# Patient Record
Sex: FEMALE | Race: WHITE | NOT HISPANIC OR LATINO | Employment: OTHER | URBAN - METROPOLITAN AREA
[De-identification: names, ages, dates, MRNs, and addresses within clinical notes are randomized per-mention and may not be internally consistent; named-entity substitution may affect disease eponyms.]

---

## 2021-09-28 ENCOUNTER — OFFICE VISIT (OUTPATIENT)
Dept: FAMILY MEDICINE CLINIC | Facility: CLINIC | Age: 51
End: 2021-09-28

## 2021-09-28 VITALS
HEART RATE: 78 BPM | BODY MASS INDEX: 22.82 KG/M2 | DIASTOLIC BLOOD PRESSURE: 80 MMHG | TEMPERATURE: 96.8 F | WEIGHT: 124 LBS | OXYGEN SATURATION: 98 % | SYSTOLIC BLOOD PRESSURE: 120 MMHG | HEIGHT: 62 IN

## 2021-09-28 DIAGNOSIS — Z12.4 ENCOUNTER FOR SCREENING FOR CERVICAL CANCER: ICD-10-CM

## 2021-09-28 DIAGNOSIS — R23.2 HOT FLASHES: ICD-10-CM

## 2021-09-28 DIAGNOSIS — Z13.31 DEPRESSION SCREENING NEGATIVE: ICD-10-CM

## 2021-09-28 DIAGNOSIS — Z00.00 ANNUAL PHYSICAL EXAM: Primary | ICD-10-CM

## 2021-09-28 DIAGNOSIS — Z86.39 HISTORY OF IRON DEFICIENCY: ICD-10-CM

## 2021-09-28 DIAGNOSIS — Z13.220 LIPID SCREENING: ICD-10-CM

## 2021-09-28 DIAGNOSIS — Z12.11 ENCOUNTER FOR SCREENING FOR MALIGNANT NEOPLASM OF COLON: ICD-10-CM

## 2021-09-28 DIAGNOSIS — N90.89 LABIAL LESION: ICD-10-CM

## 2021-09-28 DIAGNOSIS — Z12.31 ENCOUNTER FOR SCREENING MAMMOGRAM FOR BREAST CANCER: ICD-10-CM

## 2021-09-28 DIAGNOSIS — Z86.39 HISTORY OF VITAMIN D DEFICIENCY: ICD-10-CM

## 2021-09-28 DIAGNOSIS — Z11.51 ENCOUNTER FOR SCREENING FOR HUMAN PAPILLOMAVIRUS (HPV): ICD-10-CM

## 2021-09-28 DIAGNOSIS — N92.6 IRREGULAR MENSES: ICD-10-CM

## 2021-09-28 LAB
ALBUMIN SERPL-MCNC: 4.5 G/DL (ref 3.5–5.2)
ALBUMIN/GLOB SERPL: 1.9 G/DL
ALP SERPL-CCNC: 51 U/L (ref 39–117)
ALT SERPL W P-5'-P-CCNC: 6 U/L (ref 1–33)
AMORPH URATE CRY URNS QL MICRO: ABNORMAL /HPF
ANION GAP SERPL CALCULATED.3IONS-SCNC: 6.7 MMOL/L (ref 5–15)
AST SERPL-CCNC: 10 U/L (ref 1–32)
BACTERIA UR QL AUTO: ABNORMAL /HPF
BASOPHILS # BLD AUTO: 0.08 10*3/MM3 (ref 0–0.2)
BASOPHILS NFR BLD AUTO: 1.2 % (ref 0–1.5)
BILIRUB SERPL-MCNC: 0.3 MG/DL (ref 0–1.2)
BILIRUB UR QL STRIP: NEGATIVE
BUN SERPL-MCNC: 5 MG/DL (ref 6–20)
BUN/CREAT SERPL: 7.2 (ref 7–25)
CALCIUM SPEC-SCNC: 9.2 MG/DL (ref 8.6–10.5)
CHLORIDE SERPL-SCNC: 107 MMOL/L (ref 98–107)
CHOLEST SERPL-MCNC: 145 MG/DL (ref 0–200)
CLARITY UR: ABNORMAL
CO2 SERPL-SCNC: 25.3 MMOL/L (ref 22–29)
COLOR UR: YELLOW
CREAT SERPL-MCNC: 0.69 MG/DL (ref 0.57–1)
DEPRECATED RDW RBC AUTO: 44.2 FL (ref 37–54)
EOSINOPHIL # BLD AUTO: 0.08 10*3/MM3 (ref 0–0.4)
EOSINOPHIL NFR BLD AUTO: 1.2 % (ref 0.3–6.2)
ERYTHROCYTE [DISTWIDTH] IN BLOOD BY AUTOMATED COUNT: 16.7 % (ref 12.3–15.4)
FSH SERPL-ACNC: 67.2 MIU/ML
GFR SERPL CREATININE-BSD FRML MDRD: 91 ML/MIN/1.73
GLOBULIN UR ELPH-MCNC: 2.4 GM/DL
GLUCOSE SERPL-MCNC: 100 MG/DL (ref 65–99)
GLUCOSE UR STRIP-MCNC: NEGATIVE MG/DL
HCT VFR BLD AUTO: 34.7 % (ref 34–46.6)
HDLC SERPL-MCNC: 48 MG/DL (ref 40–60)
HGB BLD-MCNC: 9.8 G/DL (ref 12–15.9)
HGB UR QL STRIP.AUTO: NEGATIVE
HYALINE CASTS UR QL AUTO: ABNORMAL /LPF
IMM GRANULOCYTES # BLD AUTO: 0.01 10*3/MM3 (ref 0–0.05)
IMM GRANULOCYTES NFR BLD AUTO: 0.1 % (ref 0–0.5)
KETONES UR QL STRIP: ABNORMAL
LDLC SERPL CALC-MCNC: 83 MG/DL (ref 0–100)
LDLC/HDLC SERPL: 1.73 {RATIO}
LEUKOCYTE ESTERASE UR QL STRIP.AUTO: ABNORMAL
LH SERPL-ACNC: 58.7 MIU/ML
LYMPHOCYTES # BLD AUTO: 1.58 10*3/MM3 (ref 0.7–3.1)
LYMPHOCYTES NFR BLD AUTO: 23.3 % (ref 19.6–45.3)
MCH RBC QN AUTO: 21.2 PG (ref 26.6–33)
MCHC RBC AUTO-ENTMCNC: 28.2 G/DL (ref 31.5–35.7)
MCV RBC AUTO: 75.1 FL (ref 79–97)
MONOCYTES # BLD AUTO: 0.34 10*3/MM3 (ref 0.1–0.9)
MONOCYTES NFR BLD AUTO: 5 % (ref 5–12)
NEUTROPHILS NFR BLD AUTO: 4.68 10*3/MM3 (ref 1.7–7)
NEUTROPHILS NFR BLD AUTO: 69.2 % (ref 42.7–76)
NITRITE UR QL STRIP: NEGATIVE
NRBC BLD AUTO-RTO: 0 /100 WBC (ref 0–0.2)
PH UR STRIP.AUTO: 5.5 [PH] (ref 5–8)
PLATELET # BLD AUTO: 383 10*3/MM3 (ref 140–450)
PMV BLD AUTO: 11.4 FL (ref 6–12)
POTASSIUM SERPL-SCNC: 5.3 MMOL/L (ref 3.5–5.2)
PROGEST SERPL-MCNC: 0.13 NG/ML
PROT SERPL-MCNC: 6.9 G/DL (ref 6–8.5)
PROT UR QL STRIP: NEGATIVE
RBC # BLD AUTO: 4.62 10*6/MM3 (ref 3.77–5.28)
RBC # UR: ABNORMAL /HPF
REF LAB TEST METHOD: ABNORMAL
SODIUM SERPL-SCNC: 139 MMOL/L (ref 136–145)
SP GR UR STRIP: 1.02 (ref 1–1.03)
SQUAMOUS #/AREA URNS HPF: ABNORMAL /HPF
T4 FREE SERPL-MCNC: 0.9 NG/DL (ref 0.93–1.7)
TRIGL SERPL-MCNC: 69 MG/DL (ref 0–150)
TSH SERPL DL<=0.05 MIU/L-ACNC: 7.03 UIU/ML (ref 0.27–4.2)
UROBILINOGEN UR QL STRIP: ABNORMAL
VLDLC SERPL-MCNC: 14 MG/DL (ref 5–40)
WBC # BLD AUTO: 6.77 10*3/MM3 (ref 3.4–10.8)
WBC UR QL AUTO: ABNORMAL /HPF

## 2021-09-28 PROCEDURE — 87086 URINE CULTURE/COLONY COUNT: CPT | Performed by: NURSE PRACTITIONER

## 2021-09-28 PROCEDURE — 84443 ASSAY THYROID STIM HORMONE: CPT | Performed by: NURSE PRACTITIONER

## 2021-09-28 PROCEDURE — 83001 ASSAY OF GONADOTROPIN (FSH): CPT | Performed by: NURSE PRACTITIONER

## 2021-09-28 PROCEDURE — 3008F BODY MASS INDEX DOCD: CPT | Performed by: NURSE PRACTITIONER

## 2021-09-28 PROCEDURE — 81001 URINALYSIS AUTO W/SCOPE: CPT | Performed by: NURSE PRACTITIONER

## 2021-09-28 PROCEDURE — G0148 SCR C/V CYTO, AUTOSYS, RESCR: HCPCS | Performed by: NURSE PRACTITIONER

## 2021-09-28 PROCEDURE — 2014F MENTAL STATUS ASSESS: CPT | Performed by: NURSE PRACTITIONER

## 2021-09-28 PROCEDURE — 84144 ASSAY OF PROGESTERONE: CPT | Performed by: NURSE PRACTITIONER

## 2021-09-28 PROCEDURE — 87624 HPV HI-RISK TYP POOLED RSLT: CPT | Performed by: NURSE PRACTITIONER

## 2021-09-28 PROCEDURE — 84439 ASSAY OF FREE THYROXINE: CPT | Performed by: NURSE PRACTITIONER

## 2021-09-28 PROCEDURE — 99386 PREV VISIT NEW AGE 40-64: CPT | Performed by: NURSE PRACTITIONER

## 2021-09-28 PROCEDURE — G0123 SCREEN CERV/VAG THIN LAYER: HCPCS | Performed by: NURSE PRACTITIONER

## 2021-09-28 PROCEDURE — 80053 COMPREHEN METABOLIC PANEL: CPT | Performed by: NURSE PRACTITIONER

## 2021-09-28 PROCEDURE — 83002 ASSAY OF GONADOTROPIN (LH): CPT | Performed by: NURSE PRACTITIONER

## 2021-09-28 PROCEDURE — 85025 COMPLETE CBC W/AUTO DIFF WBC: CPT | Performed by: NURSE PRACTITIONER

## 2021-09-28 PROCEDURE — 80061 LIPID PANEL: CPT | Performed by: NURSE PRACTITIONER

## 2021-09-28 PROCEDURE — 82672 ASSAY OF ESTROGEN: CPT | Performed by: NURSE PRACTITIONER

## 2021-09-28 NOTE — PROGRESS NOTES
Venipuncture Blood Specimen Collection  Venipuncture performed in left arm  by Cammie Barone with good hemostasis. Patient tolerated the procedure well without complications.   09/28/21   Cammie Barone

## 2021-09-28 NOTE — PROGRESS NOTES
Chief Complaint  Annual Exam    Subjective            Biju Harrington presents to [facility]  History of Present Illness     Establish care - annual physical exam. Her last exam was in 2018.     She states at the time of her last physical her iron and thyroid were low - they did give her medication and she maybe took it for a month, but never refilled it or followed up after that.     Her last pap smear was a long time ago. She has not had a hysterectomy. The first day of her LMP was 9/15/2021. Her periods are irregular and have been irregular since last year. She can skip two months, then be on a period for 2 weeks. She does not have a GYN. She used to live in [place]. She has had two pregnancies with two living children, both vaginal deliveries.     Her last mammogram was done at age 42. Her first was at age 37 and her last at age 42. No history of breast cancer in the family, but mom has fibrocalcific disease. She denies any breast complaints. She does perform monthly self-exams.     She has never had a colonoscopy - she does have family history of CRC in paternal grandfather. She denies any constipation, diarrhea, or rectal bleeding. No nausea, vomiting.     PHQ-2 Total Score: 1 - she denies any concerns for depression. She was for a while but that was due to separation from her  in 2018. They are still  but not . She sees things moving in that direction. No HI/SI. She does have trouble sleeping. She states sleeping is horrible - she has night sweats, hot flashes, etc. She states that she believes that she is going through menopause.     History reviewed. No pertinent past medical history.    Allergies   Allergen Reactions   • Phenergan [Promethazine] GI Intolerance        Past Surgical History:   Procedure Laterality Date   • CHOLECYSTECTOMY     • OVARIAN CYST REMOVAL     • ROTATOR CUFF REPAIR Right    • TUBAL ABDOMINAL LIGATION          Social  "History     Tobacco Use   • Smoking status: Current Every Day Smoker     Years: 0.50     Start date: 1989   • Smokeless tobacco: Never Used   Substance Use Topics   • Alcohol use: Not on file   • Drug use: Not on file       Family History   Problem Relation Age of Onset   • Diabetes Father    • Diabetes Maternal Grandmother    • Cancer Paternal Grandfather         colon        Health Maintenance Due   Topic Date Due   • COLORECTAL CANCER SCREENING  Never done   • ANNUAL PHYSICAL  Never done   • Pneumococcal Vaccine 0-64 (1 of 2 - PPSV23) Never done   • COVID-19 Vaccine (1) Never done   • HEPATITIS C SCREENING  Never done   • PAP SMEAR  Never done        No current outpatient medications on file prior to visit.     No current facility-administered medications on file prior to visit.       Immunization History   Administered Date(s) Administered   • Tdap 07/13/2020       Review of Systems     Objective     /80   Pulse 78   Temp 96.8 °F (36 °C)   Ht 157.5 cm (62\")   Wt 56.2 kg (124 lb)   SpO2 98%   BMI 22.68 kg/m²       Physical Exam  Vitals reviewed.   Constitutional:       General: She is not in acute distress.     Appearance: Normal appearance. She is well-developed and normal weight. She is not diaphoretic.   HENT:      Head: Normocephalic and atraumatic. Hair is normal.      Right Ear: Hearing, tympanic membrane, ear canal and external ear normal. No decreased hearing noted. No drainage.      Left Ear: Hearing, tympanic membrane, ear canal and external ear normal. No decreased hearing noted.      Nose: Nose normal. No nasal deformity.      Mouth/Throat:      Lips: Pink. No lesions.      Mouth: Mucous membranes are moist.      Dentition: Normal dentition.      Pharynx: Oropharynx is clear. Uvula midline.   Eyes:      General: Lids are normal.         Right eye: No discharge.         Left eye: No discharge.      Extraocular Movements: Extraocular movements intact.      Conjunctiva/sclera: Conjunctivae " normal.      Pupils: Pupils are equal, round, and reactive to light.   Neck:      Thyroid: No thyromegaly.      Vascular: No JVD.   Cardiovascular:      Rate and Rhythm: Normal rate and regular rhythm.      Pulses: Normal pulses.      Heart sounds: Normal heart sounds. No murmur heard.   No friction rub. No gallop.    Pulmonary:      Effort: Pulmonary effort is normal. No respiratory distress.      Breath sounds: Normal breath sounds. No wheezing or rales.   Chest:      Chest wall: No deformity or tenderness.      Breasts: Breasts are symmetrical.         Right: Normal. No inverted nipple, mass, nipple discharge, skin change or tenderness.         Left: Normal. No inverted nipple, mass, nipple discharge, skin change or tenderness.   Abdominal:      General: Bowel sounds are normal. There is no distension.      Palpations: Abdomen is soft. There is no mass.      Tenderness: There is no abdominal tenderness. There is no guarding or rebound.      Hernia: No hernia is present.   Genitourinary:     General: Normal vulva.      Exam position: Lithotomy position.      Filiberto stage (genital): 5.      Labia:         Right: Lesion (approx. dime-sized cauliflower-like lesion on the right labia ) present. No rash, tenderness or injury.         Left: No rash, tenderness, lesion or injury.       Urethra: No prolapse, urethral swelling or urethral lesion.      Vagina: No signs of injury. No vaginal discharge, tenderness, bleeding, lesions or prolapsed vaginal walls.      Cervix: No cervical motion tenderness, discharge, friability, lesion, erythema or cervical bleeding.      Uterus: Not deviated, not fixed, not tender and no uterine prolapse.       Adnexa:         Right: No mass or tenderness.          Left: No mass or tenderness.        Rectum: Normal.          Comments: Vaginal atrophy noted. Cervix deviated to the left.  Musculoskeletal:         General: No tenderness or deformity. Normal range of motion.      Cervical back:  Normal range of motion and neck supple.   Lymphadenopathy:      Cervical: No cervical adenopathy.      Upper Body:      Right upper body: No axillary adenopathy.      Left upper body: No axillary adenopathy.      Lower Body: No right inguinal adenopathy. No left inguinal adenopathy.   Skin:     General: Skin is warm and dry.      Findings: No erythema or rash.   Neurological:      Mental Status: She is alert and oriented to person, place, and time.      Cranial Nerves: No cranial nerve deficit.      Motor: No abnormal muscle tone.      Coordination: Coordination normal.      Gait: Gait normal.   Psychiatric:         Mood and Affect: Mood normal.         Behavior: Behavior normal.         Thought Content: Thought content normal.         Judgment: Judgment normal.         Result Review :     The following data was reviewed by: GIANA Starr on 09/28/2021:    FERRITIN (07/13/2020 09:55)  IRON PROFILE (07/13/2020 09:55)  VITAMIN D 25 HYDROXY (07/13/2020 09:55)  TSH (07/13/2020 09:55)  LIPID PANEL (07/13/2020 09:55)  COMPREHENSIVE METABOLIC PANEL (07/13/2020 09:55)  CBC AND DIFFERENTIAL (07/13/2020 09:55)  HEMOGLOBIN A1C (07/13/2020 09:55)       Assessment and Plan      Diagnoses and all orders for this visit:    1. Annual physical exam (Primary)  -     CBC Auto Differential  -     Comprehensive Metabolic Panel  -     TSH+Free T4  -     Urinalysis With Culture If Indicated - Urine, Clean Catch    2. Encounter for screening for cervical cancer   -     PAP, Liquid Based ([facility] Only)    3. Encounter for screening for human papillomavirus (HPV)  -     IgP, Aptima HPV    4. Hot flashes  -     Estrogens, Total  -     Progesterone  -     FSH & LH    5. Lipid screening  -     Lipid Panel    6. Irregular menses  -     Estrogens, Total  -     Progesterone  -     FSH & LH    7. Encounter for screening mammogram for breast cancer  -     Mammo Screening Bilateral With CAD; Future    8. Depression screening  negative    9. Labial lesion  -     Ambulatory Referral to Gynecology    10. History of iron deficiency  -     Iron Profile  -     Ferritin    11. History of vitamin D deficiency  -     Vitamin D 25 Hydroxy    12. Encounter for screening for malignant neoplasm of colon  -     Ambulatory Referral For Screening Colonoscopy            Follow Up     Return for Recheck pending outcome of labs/imaging.    Patient was given instructions and counseling regarding her condition or for health maintenance advice. Please see specific information pulled into the AVS if appropriate.

## 2021-09-30 LAB
BACTERIA SPEC AEROBE CULT: NO GROWTH
CYTOLOGIST CVX/VAG CYTO: NORMAL
CYTOLOGY CVX/VAG DOC CYTO: NORMAL
CYTOLOGY CVX/VAG DOC THIN PREP: NORMAL
DX ICD CODE: NORMAL
HIV 1 & 2 AB SER-IMP: NORMAL
HPV I/H RISK 4 DNA CVX QL PROBE+SIG AMP: NEGATIVE
OTHER STN SPEC: NORMAL
STAT OF ADQ CVX/VAG CYTO-IMP: NORMAL

## 2021-10-02 LAB — ESTROGEN SERPL-MCNC: 78 PG/ML

## 2021-10-11 ENCOUNTER — HOSPITAL ENCOUNTER (OUTPATIENT)
Dept: MAMMOGRAPHY | Facility: HOSPITAL | Age: 51
Discharge: HOME | End: 2021-10-11
Admitting: NURSE PRACTITIONER

## 2021-10-11 DIAGNOSIS — Z12.31 ENCOUNTER FOR SCREENING MAMMOGRAM FOR BREAST CANCER: ICD-10-CM

## 2021-10-11 PROCEDURE — 77067 SCR MAMMO BI INCL CAD: CPT

## 2021-10-19 ENCOUNTER — TELEPHONE (OUTPATIENT)
Dept: GASTROENTEROLOGY | Facility: CLINIC | Age: 51
End: 2021-10-19

## 2021-10-19 NOTE — TELEPHONE ENCOUNTER
Called patient back and patient reported that she was referred for a consult for a Colonoscopy screening. Patient was put on direct access schedule.

## 2021-10-29 NOTE — TELEPHONE ENCOUNTER
Patient called and left voicemail stating that she needed to reschedule her MA DA call. Patient stated that she had forgotten about it. Patient asked if she was supposed to call or if we would call her. Patient was informed that our office would call her. Patient reported that no one called her that day and after looking through her chart, we did not have a correct number for patient on file. Patient's appointment was rescheduled and patient's number in chart was corrected.

## 2021-12-13 ENCOUNTER — PREP FOR SURGERY (OUTPATIENT)
Dept: OTHER | Facility: HOSPITAL | Age: 51
End: 2021-12-13

## 2021-12-13 ENCOUNTER — CLINICAL SUPPORT (OUTPATIENT)
Dept: GASTROENTEROLOGY | Facility: CLINIC | Age: 51
End: 2021-12-13

## 2021-12-13 ENCOUNTER — TELEPHONE (OUTPATIENT)
Dept: GASTROENTEROLOGY | Facility: CLINIC | Age: 51
End: 2021-12-13

## 2021-12-13 DIAGNOSIS — Z12.11 COLON CANCER SCREENING: Primary | ICD-10-CM

## 2021-12-13 RX ORDER — SODIUM, POTASSIUM,MAG SULFATES 17.5-3.13G
2 SOLUTION, RECONSTITUTED, ORAL ORAL TAKE AS DIRECTED
Qty: 354 ML | Refills: 0 | Status: SHIPPED | OUTPATIENT
Start: 2021-12-13 | End: 2022-02-21 | Stop reason: SDUPTHER

## 2021-12-13 NOTE — PROGRESS NOTES
SPOKE WITH PT ON A DATE FOR COLONOSCOPY OF 2/25/22 . MADE SURE CHART WAS UP TO DATE. WENT OVER PREP AND MAILED OUT INSTRUCTIONS. PUT IN ORDER FOR COLON AND SENT PREP TO PHARMACY

## 2021-12-13 NOTE — TELEPHONE ENCOUNTER
Biju Harrington  REASON FOR CALL encounter for colon screening  SENT IN PREP nultyley  No past medical history on file.  Allergies   Allergen Reactions   • Phenergan [Promethazine] GI Intolerance     Past Surgical History:   Procedure Laterality Date   • CHOLECYSTECTOMY     • OVARIAN CYST REMOVAL     • ROTATOR CUFF REPAIR Right    • TUBAL ABDOMINAL LIGATION       Social History     Socioeconomic History   • Marital status:    Tobacco Use   • Smoking status: Current Every Day Smoker     Years: 0.50     Start date: 1989   • Smokeless tobacco: Never Used     Family History   Problem Relation Age of Onset   • Diabetes Father    • Diabetes Maternal Grandmother    • Cancer Paternal Grandfather         colon     No current outpatient medications on file.

## 2021-12-28 ENCOUNTER — TELEMEDICINE (OUTPATIENT)
Dept: FAMILY MEDICINE CLINIC | Facility: CLINIC | Age: 51
End: 2021-12-28

## 2021-12-28 DIAGNOSIS — N95.1 PERIMENOPAUSAL VASOMOTOR SYMPTOMS: ICD-10-CM

## 2021-12-28 DIAGNOSIS — R45.86 MOOD CHANGES: ICD-10-CM

## 2021-12-28 DIAGNOSIS — R45.4 IRRITABILITY: Primary | ICD-10-CM

## 2021-12-28 PROCEDURE — 99214 OFFICE O/P EST MOD 30 MIN: CPT | Performed by: NURSE PRACTITIONER

## 2021-12-28 RX ORDER — VENLAFAXINE HYDROCHLORIDE 37.5 MG/1
37.5 CAPSULE, EXTENDED RELEASE ORAL DAILY
Qty: 30 CAPSULE | Refills: 0 | Status: SHIPPED | OUTPATIENT
Start: 2021-12-28 | End: 2022-01-27 | Stop reason: SDUPTHER

## 2021-12-28 NOTE — PROGRESS NOTES
Mode of Visit: Video via Haiku   Location of patient: home  You have chosen to receive care through a telehealth visit.  The patient has signed the video visit consent form.  The visit included audio and video interaction. No technical issues occurred during this visit.     Chief Complaint  Depression (restart zoloft )     Aury Harrington presents to [facility]  History of Present Illness     She wants to restart Zoloft. She has been on Zoloft in the past, but it was over a year ago, and she started taking it after  from her . Now, she and her  are back together. They are having some issues, but she feels like it is more her than him. She wants to try and make it work.     She has not had a period since September of this year. She thinks she is starting to go thru menopause. She is reynolds, irritable, and every day she finds issues with something. It is unnecessary stuff; she needs something to take the edge off. She is having hot flashes throughout the day and at night. She wakes up soaking wet at night. She denies any HI/SI. She denies any AVH. Aside from the hot flashes at night her sleep is good. She averages 6-7 hours per night. She is not tired throughout the day.    She does feel depressed at times. She has even thought about being bipolar. She states that one minute she can be 'happy, go angelique' and then the next minute she wants to tear someone's head off. She does not have a known family history, but she has a family of 'hot heads'. She can be very short-tempered at times.      Objective   Vital Signs:   There were no vitals taken for this visit.      Physical Exam   Constitutional: She appears well-developed and well-nourished. No distress.   HENT:   Head: Normocephalic and atraumatic.   Eyes: Conjunctivae and EOM are normal. No scleral icterus.   Neck: Neck normal appearance.  Pulmonary/Chest: Effort normal.   Musculoskeletal:  Normal range of motion.   Neurological: She is alert.   Skin: No rash noted. No pallor.   Psychiatric: She has a normal mood and affect. Her affect is normal. Thought content is normal. She does not express abnormal judgement.        Result Review :   The following data was reviewed by: GIANA Starr on 12/28/2021:  CMP    CMP 9/28/21   Glucose 100 (A)   BUN 5 (A)   Creatinine 0.69   eGFR Non African Am 91   Sodium 139   Potassium 5.3 (A)   Chloride 107   Calcium 9.2   Albumin 4.50   Total Bilirubin 0.3   Alkaline Phosphatase 51   AST (SGOT) 10   ALT (SGPT) 6   (A) Abnormal value            CBC    CBC 9/28/21   WBC 6.77   RBC 4.62   Hemoglobin 9.8 (A)   Hematocrit 34.7   MCV 75.1 (A)   MCH 21.2 (A)   MCHC 28.2 (A)   RDW 16.7 (A)   Platelets 383   (A) Abnormal value            Lipid Panel    Lipid Panel 9/28/21   Total Cholesterol 145   Triglycerides 69   HDL Cholesterol 48   VLDL Cholesterol 14   LDL Cholesterol  83   LDL/HDL Ratio 1.73           TSH    TSH 9/28/21   TSH 7.030 (A)   (A) Abnormal value                    Assessment and Plan      Diagnoses and all orders for this visit:    1. Irritability (Primary)  -     venlafaxine XR (EFFEXOR-XR) 37.5 MG 24 hr capsule; Take 1 capsule by mouth Daily.  Dispense: 30 capsule; Refill: 0  -     Ambulatory Referral to Psychiatry    2. Mood changes  -     venlafaxine XR (EFFEXOR-XR) 37.5 MG 24 hr capsule; Take 1 capsule by mouth Daily.  Dispense: 30 capsule; Refill: 0  -     Ambulatory Referral to Psychiatry    3. Perimenopausal vasomotor symptoms  -     venlafaxine XR (EFFEXOR-XR) 37.5 MG 24 hr capsule; Take 1 capsule by mouth Daily.  Dispense: 30 capsule; Refill: 0        Follow Up     Return in about 1 month (around 1/28/2022) for Recheck.     Trial of Effexor to see if this will help with mood and probable perimenopausal symptoms. Follow up with me in 3-4 weeks, sooner if needed. If she decides to stay with psychiatry, then she will notify the office.      Patient was given instructions and counseling regarding her condition or for health maintenance advice. Please see specific information pulled into the AVS if appropriate.

## 2022-01-24 DIAGNOSIS — N95.1 PERIMENOPAUSAL VASOMOTOR SYMPTOMS: ICD-10-CM

## 2022-01-24 DIAGNOSIS — R45.86 MOOD CHANGES: ICD-10-CM

## 2022-01-24 DIAGNOSIS — R45.4 IRRITABILITY: ICD-10-CM

## 2022-01-24 RX ORDER — VENLAFAXINE HYDROCHLORIDE 37.5 MG/1
37.5 CAPSULE, EXTENDED RELEASE ORAL DAILY
Qty: 30 CAPSULE | Refills: 0 | OUTPATIENT
Start: 2022-01-24

## 2022-01-25 DIAGNOSIS — N95.1 PERIMENOPAUSAL VASOMOTOR SYMPTOMS: ICD-10-CM

## 2022-01-25 DIAGNOSIS — R45.4 IRRITABILITY: ICD-10-CM

## 2022-01-25 DIAGNOSIS — R45.86 MOOD CHANGES: ICD-10-CM

## 2022-01-25 RX ORDER — VENLAFAXINE HYDROCHLORIDE 37.5 MG/1
37.5 CAPSULE, EXTENDED RELEASE ORAL DAILY
Qty: 30 CAPSULE | Refills: 0 | OUTPATIENT
Start: 2022-01-25

## 2022-01-27 ENCOUNTER — TELEMEDICINE (OUTPATIENT)
Dept: FAMILY MEDICINE CLINIC | Facility: CLINIC | Age: 52
End: 2022-01-27

## 2022-01-27 DIAGNOSIS — R45.86 MOOD CHANGES: ICD-10-CM

## 2022-01-27 DIAGNOSIS — N93.9 ABNORMAL VAGINAL BLEEDING: ICD-10-CM

## 2022-01-27 DIAGNOSIS — N95.1 PERIMENOPAUSAL VASOMOTOR SYMPTOMS: Primary | ICD-10-CM

## 2022-01-27 DIAGNOSIS — R45.4 IRRITABILITY: ICD-10-CM

## 2022-01-27 PROCEDURE — 99213 OFFICE O/P EST LOW 20 MIN: CPT | Performed by: NURSE PRACTITIONER

## 2022-01-27 RX ORDER — VENLAFAXINE HYDROCHLORIDE 37.5 MG/1
37.5 CAPSULE, EXTENDED RELEASE ORAL DAILY
Qty: 90 CAPSULE | Refills: 0 | Status: SHIPPED | OUTPATIENT
Start: 2022-01-27 | End: 2022-04-25

## 2022-01-27 NOTE — PROGRESS NOTES
Mode of Visit: Video  Location of patient: home   Location of provider: office  You have chosen to receive care through a telehealth visit.  The patient has signed the video visit consent form.  The visit included audio and video interaction. No technical issues occurred during this visit.     Chief Complaint  Depression (refills )    Subjective            Biju Harrington presents to [facility]     History of Present Illness     She is here today for re-evaluation of Effexor - it was initiated in place of sertraline - she feels more at ease, and not like she wants to rip anyone's head off. Her  does notice a difference as well. She does feel kind of drowsy after taking it, so she switched it from AM dosing to PM dosing, and that has helped.  From 0 to 100%, she feels 100% better.  She would like to remain at her current dose.  She denies any homicidal ideations or suicidal ideations.  She denies any AVH.    She reports having amenorrhea from about October through December.  She states 3 days after initiation of Effexor she did have recurrence of menses.  She has been having menses now for the entire month of January.  She has not establish care with gynecology in [place].  She does not want a referral today.  She states that she will monitor her bleeding, and if it is persistent, then she will seek care with gynecology.    Objective   Vital Signs:   There were no vitals taken for this visit.      Physical Exam   Constitutional: She appears well-developed and well-nourished. No distress.   HENT:   Head: Normocephalic and atraumatic.   Eyes: Conjunctivae and EOM are normal. No scleral icterus.   Pulmonary/Chest: Effort normal.   Musculoskeletal: Normal range of motion.   Neurological: She is alert.   Skin: No erythema. No pallor.   Psychiatric: She has a normal mood and affect. She mood appears normal. Her affect is normal. Her behavior is normal. Thought content is normal.  She does not express abnormal judgement.        Result Review :     The following data was reviewed by: GIANA Starr on 01/27/2022:    CMP    CMP 9/28/21   Glucose 100 (A)   BUN 5 (A)   Creatinine 0.69   eGFR Non African Am 91   Sodium 139   Potassium 5.3 (A)   Chloride 107   Calcium 9.2   Albumin 4.50   Total Bilirubin 0.3   Alkaline Phosphatase 51   AST (SGOT) 10   ALT (SGPT) 6   (A) Abnormal value            CBC    CBC 9/28/21   WBC 6.77   RBC 4.62   Hemoglobin 9.8 (A)   Hematocrit 34.7   MCV 75.1 (A)   MCH 21.2 (A)   MCHC 28.2 (A)   RDW 16.7 (A)   Platelets 383   (A) Abnormal value            Lipid Panel    Lipid Panel 9/28/21   Total Cholesterol 145   Triglycerides 69   HDL Cholesterol 48   VLDL Cholesterol 14   LDL Cholesterol  83   LDL/HDL Ratio 1.73           TSH    TSH 9/28/21   TSH 7.030 (A)   (A) Abnormal value                    Assessment and Plan      Diagnoses and all orders for this visit:    1. Perimenopausal vasomotor symptoms (Primary)  -     venlafaxine XR (EFFEXOR-XR) 37.5 MG 24 hr capsule; Take 1 capsule by mouth Daily.  Dispense: 90 capsule; Refill: 0    2. Irritability  -     venlafaxine XR (EFFEXOR-XR) 37.5 MG 24 hr capsule; Take 1 capsule by mouth Daily.  Dispense: 90 capsule; Refill: 0    3. Mood changes  -     venlafaxine XR (EFFEXOR-XR) 37.5 MG 24 hr capsule; Take 1 capsule by mouth Daily.  Dispense: 90 capsule; Refill: 0    4. Abnormal vaginal bleeding  Comments:  recommended follow up with GYN or in our office for further evaluation; declined referral at appointment today              Follow Up     Return in about 3 months (around 4/27/2022) for Recheck.     Advised patient that if bleeding does not stop in the next week, then she needs to be evaluated as soon as possible. We may need to stop Effexor and try alternative to see if this is contributing.     Patient was given instructions and counseling regarding her condition or for health maintenance advice. Please see  specific information pulled into the AVS if appropriate.

## 2022-02-18 ENCOUNTER — TELEPHONE (OUTPATIENT)
Dept: GASTROENTEROLOGY | Facility: CLINIC | Age: 52
End: 2022-02-18

## 2022-02-18 NOTE — TELEPHONE ENCOUNTER
Patient called and left a voicemail. Called patient back and patient just wanted to confirm that we had the correct address for her pharmacy as there are many [facility] in [place]. Confirmed to patient that we did have the correct information. Patient confirmed that the prep had already been sent. Patient will pick it up today.

## 2022-02-21 RX ORDER — SODIUM, POTASSIUM,MAG SULFATES 17.5-3.13G
2 SOLUTION, RECONSTITUTED, ORAL ORAL TAKE AS DIRECTED
Qty: 354 ML | Refills: 0 | Status: SHIPPED | OUTPATIENT
Start: 2022-02-21 | End: 2022-02-22 | Stop reason: CLARIF

## 2022-02-23 NOTE — TELEPHONE ENCOUNTER
"Patient has called in regards to prep. She states pharmacy told her \"one was not covered\". Patient told that monae was sent and if she had any more trouble to call office.   "

## 2022-02-25 ENCOUNTER — ANESTHESIA (OUTPATIENT)
Dept: GASTROENTEROLOGY | Facility: HOSPITAL | Age: 52
End: 2022-02-25

## 2022-02-25 ENCOUNTER — HOSPITAL ENCOUNTER (OUTPATIENT)
Facility: HOSPITAL | Age: 52
Setting detail: HOSPITAL OUTPATIENT SURGERY
Discharge: HOME | End: 2022-02-25
Attending: INTERNAL MEDICINE | Admitting: INTERNAL MEDICINE

## 2022-02-25 ENCOUNTER — ANESTHESIA EVENT (OUTPATIENT)
Dept: GASTROENTEROLOGY | Facility: HOSPITAL | Age: 52
End: 2022-02-25

## 2022-02-25 VITALS
TEMPERATURE: 96.4 F | HEIGHT: 62 IN | SYSTOLIC BLOOD PRESSURE: 122 MMHG | RESPIRATION RATE: 21 BRPM | OXYGEN SATURATION: 97 % | WEIGHT: 136.24 LBS | HEART RATE: 73 BPM | DIASTOLIC BLOOD PRESSURE: 83 MMHG | BODY MASS INDEX: 25.07 KG/M2

## 2022-02-25 DIAGNOSIS — Z12.11 COLON CANCER SCREENING: ICD-10-CM

## 2022-02-25 PROCEDURE — 45380 COLONOSCOPY AND BIOPSY: CPT | Performed by: INTERNAL MEDICINE

## 2022-02-25 PROCEDURE — 88305 TISSUE EXAM BY PATHOLOGIST: CPT | Performed by: INTERNAL MEDICINE

## 2022-02-25 PROCEDURE — 25010000002 PROPOFOL 10 MG/ML EMULSION: Performed by: NURSE ANESTHETIST, CERTIFIED REGISTERED

## 2022-02-25 RX ORDER — PROPOFOL 10 MG/ML
VIAL (ML) INTRAVENOUS AS NEEDED
Status: DISCONTINUED | OUTPATIENT
Start: 2022-02-25 | End: 2022-02-25 | Stop reason: SURG

## 2022-02-25 RX ORDER — PHENYLEPHRINE HCL IN 0.9% NACL 1 MG/10 ML
SYRINGE (ML) INTRAVENOUS AS NEEDED
Status: DISCONTINUED | OUTPATIENT
Start: 2022-02-25 | End: 2022-02-25 | Stop reason: SURG

## 2022-02-25 RX ORDER — LIDOCAINE HYDROCHLORIDE 20 MG/ML
INJECTION, SOLUTION INFILTRATION; PERINEURAL AS NEEDED
Status: DISCONTINUED | OUTPATIENT
Start: 2022-02-25 | End: 2022-02-25 | Stop reason: SURG

## 2022-02-25 RX ORDER — SODIUM CHLORIDE, SODIUM LACTATE, POTASSIUM CHLORIDE, CALCIUM CHLORIDE 600; 310; 30; 20 MG/100ML; MG/100ML; MG/100ML; MG/100ML
30 INJECTION, SOLUTION INTRAVENOUS CONTINUOUS
Status: DISCONTINUED | OUTPATIENT
Start: 2022-02-25 | End: 2022-02-25 | Stop reason: HOSPADM

## 2022-02-25 RX ORDER — GLYCOPYRROLATE 0.2 MG/ML
INJECTION INTRAMUSCULAR; INTRAVENOUS AS NEEDED
Status: DISCONTINUED | OUTPATIENT
Start: 2022-02-25 | End: 2022-02-25 | Stop reason: SURG

## 2022-02-25 RX ADMIN — LIDOCAINE HYDROCHLORIDE 100 MG: 20 INJECTION, SOLUTION INFILTRATION; PERINEURAL at 13:51

## 2022-02-25 RX ADMIN — Medication 100 MCG: at 14:17

## 2022-02-25 RX ADMIN — PROPOFOL 100 MG: 10 INJECTION, EMULSION INTRAVENOUS at 13:53

## 2022-02-25 RX ADMIN — GLYCOPYRROLATE 0.2 MG: 0.2 INJECTION INTRAMUSCULAR; INTRAVENOUS at 14:08

## 2022-02-25 RX ADMIN — PROPOFOL 200 MCG/KG/MIN: 10 INJECTION, EMULSION INTRAVENOUS at 13:52

## 2022-02-25 NOTE — ANESTHESIA POSTPROCEDURE EVALUATION
Patient: Biju Harrington    Procedure Summary     Date: 02/25/22 Room / Location: [facility] ENDOSCOPY 1 / [facility] ENDOSCOPY    Anesthesia Start: 1352 Anesthesia Stop: 1438    Procedure: COLONOSCOPY WITH BIOPSY (N/A ) Diagnosis:       Colon cancer screening      (Colon cancer screening [Z12.11])    Surgeons: Leigh Andrade MD Provider: Ezequiel Larry MD    Anesthesia Type: general ASA Status: 1          Anesthesia Type: general    Vitals  Vitals Value Taken Time   BP 87/44 02/25/22 1451   Temp 35.8 °C (96.4 °F) 02/25/22 1442   Pulse 61 02/25/22 1456   Resp 16 02/25/22 1450   SpO2 100 % 02/25/22 1456   Vitals shown include unvalidated device data.        Post Anesthesia Care and Evaluation    Patient location during evaluation: bedside  Patient participation: complete - patient participated  Level of consciousness: awake  Pain management: adequate  Airway patency: patent  Anesthetic complications: No anesthetic complications  PONV Status: none  Cardiovascular status: acceptable and stable  Respiratory status: acceptable  Hydration status: acceptable    Comments: An Anesthesiologist personally participated in the most demanding procedures (including induction and emergence if applicable) in the anesthesia plan, monitored the course of anesthesia administration at frequent intervals and remained physically present and available for immediate diagnosis and treatment of emergencies.

## 2022-02-25 NOTE — ANESTHESIA PREPROCEDURE EVALUATION
Anesthesia Evaluation     Patient summary reviewed and Nursing notes reviewed   history of anesthetic complications: PONV  NPO Solid Status: > 8 hours  NPO Liquid Status: > 2 hours           Airway   Mallampati: II  TM distance: >3 FB  Neck ROM: full  No difficulty expected  Dental      Pulmonary - negative pulmonary ROS and normal exam    breath sounds clear to auscultation  Cardiovascular - negative cardio ROS and normal exam  Exercise tolerance: good (4-7 METS)    Rhythm: regular  Rate: normal        Neuro/Psych- negative ROS  GI/Hepatic/Renal/Endo - negative ROS     Musculoskeletal     Abdominal    Substance History      OB/GYN          Other                        Anesthesia Plan    ASA 1     general       Anesthetic plan, all risks, benefits, and alternatives have been provided, discussed and informed consent has been obtained with: patient.        CODE STATUS:

## 2022-03-01 LAB
CYTO UR: NORMAL
LAB AP CASE REPORT: NORMAL
LAB AP CLINICAL INFORMATION: NORMAL
PATH REPORT.FINAL DX SPEC: NORMAL
PATH REPORT.GROSS SPEC: NORMAL

## 2022-03-02 ENCOUNTER — TELEPHONE (OUTPATIENT)
Dept: GASTROENTEROLOGY | Facility: CLINIC | Age: 52
End: 2022-03-02

## 2022-03-02 NOTE — TELEPHONE ENCOUNTER
----- Message from GIANA Lew sent at 3/2/2022  2:27 PM EST -----  Please place in recall for repeat colonoscopy in 5 years.  Send letter to patient and PCP.

## 2022-04-25 DIAGNOSIS — N95.1 PERIMENOPAUSAL VASOMOTOR SYMPTOMS: ICD-10-CM

## 2022-04-25 DIAGNOSIS — R45.86 MOOD CHANGES: ICD-10-CM

## 2022-04-25 DIAGNOSIS — R45.4 IRRITABILITY: ICD-10-CM

## 2022-04-25 RX ORDER — VENLAFAXINE HYDROCHLORIDE 37.5 MG/1
37.5 CAPSULE, EXTENDED RELEASE ORAL DAILY
Qty: 30 CAPSULE | Refills: 0 | Status: SHIPPED | OUTPATIENT
Start: 2022-04-25 | End: 2022-05-10

## 2022-04-26 ENCOUNTER — HOSPITAL ENCOUNTER (OUTPATIENT)
Facility: HOSPITAL | Age: 52
Setting detail: OBSERVATION
Discharge: HOME | End: 2022-04-27
Attending: EMERGENCY MEDICINE | Admitting: INTERNAL MEDICINE

## 2022-04-26 ENCOUNTER — APPOINTMENT (OUTPATIENT)
Dept: ULTRASOUND IMAGING | Facility: HOSPITAL | Age: 52
End: 2022-04-26

## 2022-04-26 ENCOUNTER — APPOINTMENT (OUTPATIENT)
Dept: CT IMAGING | Facility: HOSPITAL | Age: 52
End: 2022-04-26

## 2022-04-26 DIAGNOSIS — K59.00 CONSTIPATION, UNSPECIFIED CONSTIPATION TYPE: ICD-10-CM

## 2022-04-26 DIAGNOSIS — R10.9 ACUTE ABDOMINAL PAIN: Primary | ICD-10-CM

## 2022-04-26 DIAGNOSIS — D62 ACUTE BLOOD LOSS ANEMIA: ICD-10-CM

## 2022-04-26 DIAGNOSIS — N93.8 DYSFUNCTIONAL UTERINE BLEEDING: ICD-10-CM

## 2022-04-26 PROBLEM — D50.9 MICROCYTIC ANEMIA: Status: ACTIVE | Noted: 2022-04-26

## 2022-04-26 LAB
ABO GROUP BLD: NORMAL
ALBUMIN SERPL-MCNC: 4.1 G/DL (ref 3.5–5.2)
ALBUMIN/GLOB SERPL: 1.7 G/DL
ALP SERPL-CCNC: 43 U/L (ref 39–117)
ALT SERPL W P-5'-P-CCNC: 9 U/L (ref 1–33)
ANION GAP SERPL CALCULATED.3IONS-SCNC: 9.6 MMOL/L (ref 5–15)
ANISOCYTOSIS BLD QL: ABNORMAL
AST SERPL-CCNC: 11 U/L (ref 1–32)
BASOPHILS # BLD MANUAL: 0.11 10*3/MM3 (ref 0–0.2)
BASOPHILS NFR BLD MANUAL: 2.1 % (ref 0–1.5)
BILIRUB SERPL-MCNC: 0.3 MG/DL (ref 0–1.2)
BILIRUB UR QL STRIP: NEGATIVE
BLD GP AB SCN SERPL QL: NEGATIVE
BUN SERPL-MCNC: 9 MG/DL (ref 6–20)
BUN/CREAT SERPL: 13.8 (ref 7–25)
CALCIUM SPEC-SCNC: 8 MG/DL (ref 8.6–10.5)
CHLORIDE SERPL-SCNC: 105 MMOL/L (ref 98–107)
CLARITY UR: CLEAR
CO2 SERPL-SCNC: 22.4 MMOL/L (ref 22–29)
COLOR UR: YELLOW
CREAT SERPL-MCNC: 0.65 MG/DL (ref 0.57–1)
DEPRECATED RDW RBC AUTO: 52.4 FL (ref 37–54)
EGFRCR SERPLBLD CKD-EPI 2021: 108.1 ML/MIN/1.73
EOSINOPHIL # BLD MANUAL: 0.16 10*3/MM3 (ref 0–0.4)
EOSINOPHIL NFR BLD MANUAL: 3.1 % (ref 0.3–6.2)
ERYTHROCYTE [DISTWIDTH] IN BLOOD BY AUTOMATED COUNT: 19.8 % (ref 12.3–15.4)
FERRITIN SERPL-MCNC: 5.21 NG/ML (ref 13–150)
FOLATE SERPL-MCNC: 4.75 NG/ML (ref 4.78–24.2)
GLOBULIN UR ELPH-MCNC: 2.4 GM/DL
GLUCOSE SERPL-MCNC: 100 MG/DL (ref 65–99)
GLUCOSE UR STRIP-MCNC: NEGATIVE MG/DL
HCG SERPL QL: NEGATIVE
HCT VFR BLD AUTO: 24.4 % (ref 34–46.6)
HGB BLD-MCNC: 6.9 G/DL (ref 12–15.9)
HGB UR QL STRIP.AUTO: NEGATIVE
HOLD SPECIMEN: NORMAL
HOLD SPECIMEN: NORMAL
INR PPP: 1.09 (ref 0.9–1.1)
IRON 24H UR-MRATE: 10 MCG/DL (ref 37–145)
IRON 24H UR-MRATE: 14 MCG/DL (ref 37–145)
IRON SATN MFR SERPL: 3 % (ref 20–50)
KETONES UR QL STRIP: NEGATIVE
LEUKOCYTE ESTERASE UR QL STRIP.AUTO: NEGATIVE
LIPASE SERPL-CCNC: 17 U/L (ref 13–60)
LYMPHOCYTES # BLD MANUAL: 1.29 10*3/MM3 (ref 0.7–3.1)
LYMPHOCYTES NFR BLD MANUAL: 3.1 % (ref 5–12)
MCH RBC QN AUTO: 21.2 PG (ref 26.6–33)
MCHC RBC AUTO-ENTMCNC: 28.3 G/DL (ref 31.5–35.7)
MCV RBC AUTO: 74.8 FL (ref 79–97)
MICROCYTES BLD QL: ABNORMAL
MONOCYTES # BLD: 0.16 10*3/MM3 (ref 0.1–0.9)
NEUTROPHILS # BLD AUTO: 3.5 10*3/MM3 (ref 1.7–7)
NEUTROPHILS NFR BLD MANUAL: 67 % (ref 42.7–76)
NITRITE UR QL STRIP: NEGATIVE
PH UR STRIP.AUTO: 5.5 [PH] (ref 5–8)
PLAT MORPH BLD: NORMAL
PLATELET # BLD AUTO: 336 10*3/MM3 (ref 140–450)
PMV BLD AUTO: 10.6 FL (ref 6–12)
POTASSIUM SERPL-SCNC: 3.7 MMOL/L (ref 3.5–5.2)
PROT SERPL-MCNC: 6.5 G/DL (ref 6–8.5)
PROT UR QL STRIP: NEGATIVE
PROTHROMBIN TIME: 14 SECONDS (ref 11.7–14.2)
RBC # BLD AUTO: 3.26 10*6/MM3 (ref 3.77–5.28)
RETICS # AUTO: 0.07 10*6/MM3 (ref 0.02–0.13)
RETICS/RBC NFR AUTO: 2 % (ref 0.7–1.9)
RH BLD: POSITIVE
SARS-COV-2 RNA RESP QL NAA+PROBE: NOT DETECTED
SODIUM SERPL-SCNC: 137 MMOL/L (ref 136–145)
SP GR UR STRIP: <1.005 (ref 1–1.03)
T&S EXPIRATION DATE: NORMAL
TIBC SERPL-MCNC: 495 MCG/DL (ref 298–536)
TRANSFERRIN SERPL-MCNC: 332 MG/DL (ref 200–360)
UROBILINOGEN UR QL STRIP: ABNORMAL
VARIANT LYMPHS NFR BLD MANUAL: 24.7 % (ref 19.6–45.3)
VIT B12 BLD-MCNC: 389 PG/ML (ref 211–946)
WBC MORPH BLD: NORMAL
WBC NRBC COR # BLD: 5.23 10*3/MM3 (ref 3.4–10.8)
WHOLE BLOOD HOLD SPECIMEN: NORMAL
WHOLE BLOOD HOLD SPECIMEN: NORMAL

## 2022-04-26 PROCEDURE — 25010000002 NA FERRIC GLUC CPLX PER 12.5 MG: Performed by: INTERNAL MEDICINE

## 2022-04-26 PROCEDURE — 76830 TRANSVAGINAL US NON-OB: CPT

## 2022-04-26 PROCEDURE — 82746 ASSAY OF FOLIC ACID SERUM: CPT | Performed by: INTERNAL MEDICINE

## 2022-04-26 PROCEDURE — 83540 ASSAY OF IRON: CPT | Performed by: EMERGENCY MEDICINE

## 2022-04-26 PROCEDURE — U0003 INFECTIOUS AGENT DETECTION BY NUCLEIC ACID (DNA OR RNA); SEVERE ACUTE RESPIRATORY SYNDROME CORONAVIRUS 2 (SARS-COV-2) (CORONAVIRUS DISEASE [COVID-19]), AMPLIFIED PROBE TECHNIQUE, MAKING USE OF HIGH THROUGHPUT TECHNOLOGIES AS DESCRIBED BY CMS-2020-01-R: HCPCS | Performed by: EMERGENCY MEDICINE

## 2022-04-26 PROCEDURE — P9016 RBC LEUKOCYTES REDUCED: HCPCS

## 2022-04-26 PROCEDURE — 82607 VITAMIN B-12: CPT | Performed by: INTERNAL MEDICINE

## 2022-04-26 PROCEDURE — 85610 PROTHROMBIN TIME: CPT | Performed by: OBSTETRICS & GYNECOLOGY

## 2022-04-26 PROCEDURE — 85025 COMPLETE CBC W/AUTO DIFF WBC: CPT | Performed by: EMERGENCY MEDICINE

## 2022-04-26 PROCEDURE — 81003 URINALYSIS AUTO W/O SCOPE: CPT | Performed by: EMERGENCY MEDICINE

## 2022-04-26 PROCEDURE — 74177 CT ABD & PELVIS W/CONTRAST: CPT

## 2022-04-26 PROCEDURE — G0378 HOSPITAL OBSERVATION PER HR: HCPCS

## 2022-04-26 PROCEDURE — 84703 CHORIONIC GONADOTROPIN ASSAY: CPT | Performed by: EMERGENCY MEDICINE

## 2022-04-26 PROCEDURE — 85014 HEMATOCRIT: CPT | Performed by: INTERNAL MEDICINE

## 2022-04-26 PROCEDURE — 86901 BLOOD TYPING SEROLOGIC RH(D): CPT | Performed by: EMERGENCY MEDICINE

## 2022-04-26 PROCEDURE — 85007 BL SMEAR W/DIFF WBC COUNT: CPT | Performed by: EMERGENCY MEDICINE

## 2022-04-26 PROCEDURE — 86850 RBC ANTIBODY SCREEN: CPT | Performed by: EMERGENCY MEDICINE

## 2022-04-26 PROCEDURE — 86923 COMPATIBILITY TEST ELECTRIC: CPT

## 2022-04-26 PROCEDURE — 99284 EMERGENCY DEPT VISIT MOD MDM: CPT

## 2022-04-26 PROCEDURE — 85730 THROMBOPLASTIN TIME PARTIAL: CPT | Performed by: INTERNAL MEDICINE

## 2022-04-26 PROCEDURE — 82728 ASSAY OF FERRITIN: CPT | Performed by: INTERNAL MEDICINE

## 2022-04-26 PROCEDURE — C9803 HOPD COVID-19 SPEC COLLECT: HCPCS

## 2022-04-26 PROCEDURE — 84466 ASSAY OF TRANSFERRIN: CPT | Performed by: EMERGENCY MEDICINE

## 2022-04-26 PROCEDURE — 96375 TX/PRO/DX INJ NEW DRUG ADDON: CPT

## 2022-04-26 PROCEDURE — 86900 BLOOD TYPING SEROLOGIC ABO: CPT

## 2022-04-26 PROCEDURE — 86901 BLOOD TYPING SEROLOGIC RH(D): CPT

## 2022-04-26 PROCEDURE — 25010000002 IOPAMIDOL 61 % SOLUTION: Performed by: EMERGENCY MEDICINE

## 2022-04-26 PROCEDURE — 86900 BLOOD TYPING SEROLOGIC ABO: CPT | Performed by: EMERGENCY MEDICINE

## 2022-04-26 PROCEDURE — 83690 ASSAY OF LIPASE: CPT | Performed by: EMERGENCY MEDICINE

## 2022-04-26 PROCEDURE — 80053 COMPREHEN METABOLIC PANEL: CPT | Performed by: EMERGENCY MEDICINE

## 2022-04-26 PROCEDURE — 36430 TRANSFUSION BLD/BLD COMPNT: CPT

## 2022-04-26 PROCEDURE — 63710000001 DIPHENHYDRAMINE PER 50 MG: Performed by: INTERNAL MEDICINE

## 2022-04-26 PROCEDURE — 85018 HEMOGLOBIN: CPT | Performed by: INTERNAL MEDICINE

## 2022-04-26 PROCEDURE — 85045 AUTOMATED RETICULOCYTE COUNT: CPT | Performed by: INTERNAL MEDICINE

## 2022-04-26 RX ORDER — NITROGLYCERIN 0.4 MG/1
0.4 TABLET SUBLINGUAL
Status: DISCONTINUED | OUTPATIENT
Start: 2022-04-26 | End: 2022-04-27 | Stop reason: HOSPADM

## 2022-04-26 RX ORDER — ACETAMINOPHEN 160 MG/5ML
650 SOLUTION ORAL EVERY 4 HOURS PRN
Status: DISCONTINUED | OUTPATIENT
Start: 2022-04-26 | End: 2022-04-27 | Stop reason: HOSPADM

## 2022-04-26 RX ORDER — ACETAMINOPHEN 325 MG/1
650 TABLET ORAL ONCE
Status: COMPLETED | OUTPATIENT
Start: 2022-04-26 | End: 2022-04-26

## 2022-04-26 RX ORDER — NALOXONE HCL 0.4 MG/ML
0.4 VIAL (ML) INJECTION
Status: DISCONTINUED | OUTPATIENT
Start: 2022-04-26 | End: 2022-04-27 | Stop reason: HOSPADM

## 2022-04-26 RX ORDER — ACETAMINOPHEN 325 MG/1
650 TABLET ORAL EVERY 4 HOURS PRN
Status: DISCONTINUED | OUTPATIENT
Start: 2022-04-26 | End: 2022-04-27 | Stop reason: HOSPADM

## 2022-04-26 RX ORDER — ONDANSETRON 2 MG/ML
4 INJECTION INTRAMUSCULAR; INTRAVENOUS ONCE
Status: DISCONTINUED | OUTPATIENT
Start: 2022-04-26 | End: 2022-04-27 | Stop reason: HOSPADM

## 2022-04-26 RX ORDER — DIPHENHYDRAMINE HCL 50 MG
50 CAPSULE ORAL ONCE
Status: COMPLETED | OUTPATIENT
Start: 2022-04-26 | End: 2022-04-26

## 2022-04-26 RX ORDER — VENLAFAXINE HYDROCHLORIDE 37.5 MG/1
37.5 CAPSULE, EXTENDED RELEASE ORAL DAILY
Status: DISCONTINUED | OUTPATIENT
Start: 2022-04-26 | End: 2022-04-27 | Stop reason: HOSPADM

## 2022-04-26 RX ORDER — HYDROCODONE BITARTRATE AND ACETAMINOPHEN 7.5; 325 MG/1; MG/1
2 TABLET ORAL EVERY 4 HOURS PRN
Status: DISCONTINUED | OUTPATIENT
Start: 2022-04-26 | End: 2022-04-27 | Stop reason: HOSPADM

## 2022-04-26 RX ORDER — SODIUM CHLORIDE 0.9 % (FLUSH) 0.9 %
10 SYRINGE (ML) INJECTION AS NEEDED
Status: DISCONTINUED | OUTPATIENT
Start: 2022-04-26 | End: 2022-04-27 | Stop reason: HOSPADM

## 2022-04-26 RX ORDER — FENTANYL CITRATE 50 UG/ML
50 INJECTION, SOLUTION INTRAMUSCULAR; INTRAVENOUS ONCE
Status: DISCONTINUED | OUTPATIENT
Start: 2022-04-26 | End: 2022-04-26

## 2022-04-26 RX ORDER — PANTOPRAZOLE SODIUM 40 MG/10ML
40 INJECTION, POWDER, LYOPHILIZED, FOR SOLUTION INTRAVENOUS ONCE
Status: COMPLETED | OUTPATIENT
Start: 2022-04-26 | End: 2022-04-26

## 2022-04-26 RX ORDER — MORPHINE SULFATE 2 MG/ML
1 INJECTION, SOLUTION INTRAMUSCULAR; INTRAVENOUS EVERY 4 HOURS PRN
Status: DISCONTINUED | OUTPATIENT
Start: 2022-04-26 | End: 2022-04-27 | Stop reason: HOSPADM

## 2022-04-26 RX ORDER — DIPHENHYDRAMINE HCL 25 MG
50 CAPSULE ORAL ONCE
Status: DISCONTINUED | OUTPATIENT
Start: 2022-04-26 | End: 2022-04-26

## 2022-04-26 RX ORDER — ONDANSETRON 2 MG/ML
4 INJECTION INTRAMUSCULAR; INTRAVENOUS EVERY 6 HOURS PRN
Status: DISCONTINUED | OUTPATIENT
Start: 2022-04-26 | End: 2022-04-27 | Stop reason: HOSPADM

## 2022-04-26 RX ORDER — ACETAMINOPHEN 325 MG/1
650 TABLET ORAL ONCE
Status: DISCONTINUED | OUTPATIENT
Start: 2022-04-26 | End: 2022-04-26

## 2022-04-26 RX ORDER — SODIUM CHLORIDE 0.9 % (FLUSH) 0.9 %
10 SYRINGE (ML) INJECTION EVERY 12 HOURS SCHEDULED
Status: DISCONTINUED | OUTPATIENT
Start: 2022-04-26 | End: 2022-04-27 | Stop reason: HOSPADM

## 2022-04-26 RX ORDER — ACETAMINOPHEN 650 MG/1
650 SUPPOSITORY RECTAL EVERY 4 HOURS PRN
Status: DISCONTINUED | OUTPATIENT
Start: 2022-04-26 | End: 2022-04-27 | Stop reason: HOSPADM

## 2022-04-26 RX ADMIN — Medication 10 ML: at 21:37

## 2022-04-26 RX ADMIN — DIPHENHYDRAMINE HCL 50 MG: 50 CAPSULE ORAL at 21:32

## 2022-04-26 RX ADMIN — ACETAMINOPHEN 650 MG: 325 TABLET ORAL at 21:33

## 2022-04-26 RX ADMIN — PANTOPRAZOLE SODIUM 40 MG: 40 INJECTION, POWDER, FOR SOLUTION INTRAVENOUS at 16:50

## 2022-04-26 RX ADMIN — SODIUM CHLORIDE 500 ML: 9 INJECTION, SOLUTION INTRAVENOUS at 14:00

## 2022-04-26 RX ADMIN — SODIUM CHLORIDE 250 MG: 9 INJECTION, SOLUTION INTRAVENOUS at 21:36

## 2022-04-26 RX ADMIN — IOPAMIDOL 85 ML: 612 INJECTION, SOLUTION INTRAVENOUS at 14:47

## 2022-04-27 ENCOUNTER — READMISSION MANAGEMENT (OUTPATIENT)
Dept: CALL CENTER | Facility: HOSPITAL | Age: 52
End: 2022-04-27

## 2022-04-27 VITALS
HEIGHT: 62 IN | TEMPERATURE: 98.3 F | BODY MASS INDEX: 24.29 KG/M2 | HEART RATE: 64 BPM | DIASTOLIC BLOOD PRESSURE: 74 MMHG | SYSTOLIC BLOOD PRESSURE: 122 MMHG | WEIGHT: 132 LBS | OXYGEN SATURATION: 98 % | RESPIRATION RATE: 20 BRPM

## 2022-04-27 PROBLEM — D52.9 FOLIC ACID DEFICIENCY ANEMIA: Status: ACTIVE | Noted: 2022-04-27

## 2022-04-27 PROBLEM — E53.8 FOLIC ACID DEFICIENCY: Status: ACTIVE | Noted: 2022-04-27

## 2022-04-27 PROBLEM — D50.0 IRON DEFICIENCY ANEMIA DUE TO CHRONIC BLOOD LOSS: Status: ACTIVE | Noted: 2022-04-27

## 2022-04-27 PROBLEM — D25.9 FIBROID, UTERINE: Status: ACTIVE | Noted: 2022-04-27

## 2022-04-27 LAB
APTT PPP: 45 SECONDS (ref 22.7–35.4)
BH BB BLOOD EXPIRATION DATE: NORMAL
BH BB BLOOD TYPE BARCODE: 6200
BH BB DISPENSE STATUS: NORMAL
BH BB PRODUCT CODE: NORMAL
BH BB UNIT NUMBER: NORMAL
CROSSMATCH INTERPRETATION: NORMAL
HCT VFR BLD AUTO: 26.1 % (ref 34–46.6)
HCT VFR BLD AUTO: 27.8 % (ref 34–46.6)
HGB BLD-MCNC: 7.8 G/DL (ref 12–15.9)
HGB BLD-MCNC: 7.9 G/DL (ref 12–15.9)
UNIT  ABO: NORMAL
UNIT  RH: NORMAL

## 2022-04-27 PROCEDURE — 96365 THER/PROPH/DIAG IV INF INIT: CPT

## 2022-04-27 PROCEDURE — 85014 HEMATOCRIT: CPT | Performed by: INTERNAL MEDICINE

## 2022-04-27 PROCEDURE — 25010000002 CYANOCOBALAMIN PER 1000 MCG: Performed by: INTERNAL MEDICINE

## 2022-04-27 PROCEDURE — 63710000001 DIPHENHYDRAMINE PER 50 MG: Performed by: INTERNAL MEDICINE

## 2022-04-27 PROCEDURE — 25010000002 NA FERRIC GLUC CPLX PER 12.5 MG: Performed by: INTERNAL MEDICINE

## 2022-04-27 PROCEDURE — 85018 HEMOGLOBIN: CPT | Performed by: INTERNAL MEDICINE

## 2022-04-27 PROCEDURE — G0378 HOSPITAL OBSERVATION PER HR: HCPCS

## 2022-04-27 PROCEDURE — 96367 TX/PROPH/DG ADDL SEQ IV INF: CPT

## 2022-04-27 PROCEDURE — 96372 THER/PROPH/DIAG INJ SC/IM: CPT

## 2022-04-27 PROCEDURE — 96366 THER/PROPH/DIAG IV INF ADDON: CPT

## 2022-04-27 RX ORDER — MULTIPLE VITAMINS W/ MINERALS TAB 9MG-400MCG
1 TAB ORAL DAILY
Start: 2022-04-28

## 2022-04-27 RX ORDER — FAMOTIDINE 20 MG/1
20 TABLET, FILM COATED ORAL ONCE
Status: COMPLETED | OUTPATIENT
Start: 2022-04-27 | End: 2022-04-27

## 2022-04-27 RX ORDER — FOLIC ACID 1 MG/1
1 TABLET ORAL DAILY
Qty: 30 TABLET | Refills: 0 | Status: SHIPPED | OUTPATIENT
Start: 2022-04-27

## 2022-04-27 RX ORDER — ACETAMINOPHEN 325 MG/1
650 TABLET ORAL EVERY 6 HOURS PRN
Start: 2022-04-27

## 2022-04-27 RX ORDER — DIPHENHYDRAMINE HCL 25 MG
25 CAPSULE ORAL ONCE
Status: COMPLETED | OUTPATIENT
Start: 2022-04-27 | End: 2022-04-27

## 2022-04-27 RX ORDER — CYANOCOBALAMIN 1000 UG/ML
1000 INJECTION, SOLUTION INTRAMUSCULAR; SUBCUTANEOUS
Status: DISCONTINUED | OUTPATIENT
Start: 2022-04-27 | End: 2022-04-27 | Stop reason: HOSPADM

## 2022-04-27 RX ORDER — FERROUS SULFATE 325(65) MG
325 TABLET ORAL
Qty: 30 TABLET | Refills: 0 | Status: SHIPPED | OUTPATIENT
Start: 2022-04-27

## 2022-04-27 RX ORDER — MULTIPLE VITAMINS W/ MINERALS TAB 9MG-400MCG
1 TAB ORAL DAILY
Status: DISCONTINUED | OUTPATIENT
Start: 2022-04-27 | End: 2022-04-27 | Stop reason: HOSPADM

## 2022-04-27 RX ORDER — LANOLIN ALCOHOL/MO/W.PET/CERES
1000 CREAM (GRAM) TOPICAL DAILY
Qty: 30 TABLET | Refills: 0 | Status: SHIPPED | OUTPATIENT
Start: 2022-04-27

## 2022-04-27 RX ADMIN — HYDROCODONE BITARTRATE AND ACETAMINOPHEN 2 TABLET: 7.5; 325 TABLET ORAL at 08:53

## 2022-04-27 RX ADMIN — Medication 10 ML: at 08:44

## 2022-04-27 RX ADMIN — VENLAFAXINE HYDROCHLORIDE 37.5 MG: 37.5 CAPSULE, EXTENDED RELEASE ORAL at 08:44

## 2022-04-27 RX ADMIN — FAMOTIDINE 20 MG: 20 TABLET ORAL at 08:44

## 2022-04-27 RX ADMIN — DIPHENHYDRAMINE HYDROCHLORIDE 25 MG: 25 CAPSULE ORAL at 12:51

## 2022-04-27 RX ADMIN — CYANOCOBALAMIN 1000 MCG: 1000 INJECTION, SOLUTION INTRAMUSCULAR at 08:44

## 2022-04-27 RX ADMIN — SODIUM CHLORIDE 250 MG: 9 INJECTION, SOLUTION INTRAVENOUS at 08:44

## 2022-04-27 RX ADMIN — MULTIPLE VITAMINS W/ MINERALS TAB 1 TABLET: TAB at 08:44

## 2022-04-27 RX ADMIN — NORETHINDRONE 5 MG: 5 TABLET ORAL at 12:51

## 2022-04-27 RX ADMIN — FOLIC ACID 1 MG: 5 INJECTION, SOLUTION INTRAMUSCULAR; INTRAVENOUS; SUBCUTANEOUS at 11:00

## 2022-04-27 NOTE — OUTREACH NOTE
Prep Survey    Flowsheet Row Responses   [facility] patient discharged from? [place]   Is LACE score < 7 ? Yes   Emergency Room discharge w/ pulse ox? No   Eligibility [facility]   Date of Admission 04/26/22   Date of Discharge 04/27/22   Discharge Disposition Home or Self Care   Discharge diagnosis dysfunctional uterine bleeding,  anemia   Does the patient have one of the following disease processes/diagnoses(primary or secondary)? Other   Does the patient have Home health ordered? No   Is there a DME ordered? No   Prep survey completed? Yes          AURELIANO Lui Registered Nurse

## 2022-04-28 ENCOUNTER — TRANSITIONAL CARE MANAGEMENT TELEPHONE ENCOUNTER (OUTPATIENT)
Dept: CALL CENTER | Facility: HOSPITAL | Age: 52
End: 2022-04-28

## 2022-04-28 NOTE — OUTREACH NOTE
Call Center TCM Note    Flowsheet Row Responses   [facility] patient discharged from? [place]   Does the patient have one of the following disease processes/diagnoses(primary or secondary)? Other   TCM attempt successful? Yes  [verbal release for Anshu,  and mother *number not on chart]   Call start time 1454   Call end time 1502   Discharge diagnosis dysfunctional uterine bleeding,  anemia   Meds reviewed with patient/caregiver? Yes   Is the patient having any side effects they believe may be caused by any medication additions or changes? No   Does the patient have all medications ordered at discharge? Yes   Is the patient taking all medications as directed (includes completed medication regime)? Yes   Comments regarding appointments Patient will call to schedule GYN appt   Does the patient have a primary care provider?  Yes   Does the patient have an appointment with their PCP within 7 days of discharge? Yes   Comments regarding PCP Hospital PCP FOLLOW UP APPOINTMENT IS 5/4/22@145pm   Has the patient kept scheduled appointments due by today? N/A   Has home health visited the patient within 72 hours of discharge? N/A   Psychosocial issues? No   Did the patient receive a copy of their discharge instructions? Yes   Nursing interventions Reviewed instructions with patient   What is the patient's perception of their health status since discharge? Improving  [Patient reports she still feels sluggish today but reports she is not passing any more clots--bleeding she is experiencing is more like menstrual flow.  Aware of return precautions. ]   Is the patient/caregiver able to teach back signs and symptoms related to disease process for when to call PCP? Yes   Is the patient/caregiver able to teach back signs and symptoms related to disease process for when to call 911? Yes   Is the patient/caregiver able to teach back the hierarchy of who to call/visit for symptoms/problems? PCP, Specialist, Home health  nurse, Urgent Care, ED, 911 Yes   Additional teach back comments Patient has a small pinpoint knot in AC from IV site removal--no pain, no redness, no warmth or edema noted.  Encouraged to monitor for increasing s/s and notify MD durann.    TCM call completed? Yes          Debra Arndt RN    4/28/2022, 15:05 EDT

## 2022-04-29 ENCOUNTER — TELEPHONE (OUTPATIENT)
Dept: OBSTETRICS AND GYNECOLOGY | Facility: CLINIC | Age: 52
End: 2022-04-29

## 2022-05-04 ENCOUNTER — OFFICE VISIT (OUTPATIENT)
Dept: FAMILY MEDICINE CLINIC | Facility: CLINIC | Age: 52
End: 2022-05-04

## 2022-05-04 VITALS
DIASTOLIC BLOOD PRESSURE: 84 MMHG | WEIGHT: 140 LBS | OXYGEN SATURATION: 100 % | BODY MASS INDEX: 25.76 KG/M2 | SYSTOLIC BLOOD PRESSURE: 126 MMHG | HEART RATE: 70 BPM | TEMPERATURE: 97.6 F | HEIGHT: 62 IN

## 2022-05-04 DIAGNOSIS — N93.8 DYSFUNCTIONAL UTERINE BLEEDING: ICD-10-CM

## 2022-05-04 DIAGNOSIS — Z09 HOSPITAL DISCHARGE FOLLOW-UP: Primary | ICD-10-CM

## 2022-05-04 DIAGNOSIS — D50.0 IRON DEFICIENCY ANEMIA DUE TO CHRONIC BLOOD LOSS: ICD-10-CM

## 2022-05-04 DIAGNOSIS — R22.31 SKIN LUMP OF ARM, RIGHT: ICD-10-CM

## 2022-05-04 DIAGNOSIS — E53.8 B12 DEFICIENCY: ICD-10-CM

## 2022-05-04 DIAGNOSIS — E53.8 FOLATE DEFICIENCY: ICD-10-CM

## 2022-05-04 DIAGNOSIS — M79.601 PAIN IN RIGHT ARM: ICD-10-CM

## 2022-05-04 LAB
BASOPHILS # BLD AUTO: 0.1 10*3/MM3 (ref 0–0.2)
BASOPHILS NFR BLD AUTO: 1.3 % (ref 0–1.5)
DEPRECATED RDW RBC AUTO: 71.3 FL (ref 37–54)
EOSINOPHIL # BLD AUTO: 0.13 10*3/MM3 (ref 0–0.4)
EOSINOPHIL NFR BLD AUTO: 1.7 % (ref 0.3–6.2)
ERYTHROCYTE [DISTWIDTH] IN BLOOD BY AUTOMATED COUNT: 23.9 % (ref 12.3–15.4)
HCT VFR BLD AUTO: 33.1 % (ref 34–46.6)
HGB BLD-MCNC: 9.4 G/DL (ref 12–15.9)
IMM GRANULOCYTES # BLD AUTO: 0.02 10*3/MM3 (ref 0–0.05)
IMM GRANULOCYTES NFR BLD AUTO: 0.3 % (ref 0–0.5)
LYMPHOCYTES # BLD AUTO: 2.06 10*3/MM3 (ref 0.7–3.1)
LYMPHOCYTES NFR BLD AUTO: 26.7 % (ref 19.6–45.3)
MCH RBC QN AUTO: 23.7 PG (ref 26.6–33)
MCHC RBC AUTO-ENTMCNC: 28.4 G/DL (ref 31.5–35.7)
MCV RBC AUTO: 83.6 FL (ref 79–97)
MONOCYTES # BLD AUTO: 0.51 10*3/MM3 (ref 0.1–0.9)
MONOCYTES NFR BLD AUTO: 6.6 % (ref 5–12)
NEUTROPHILS NFR BLD AUTO: 4.89 10*3/MM3 (ref 1.7–7)
NEUTROPHILS NFR BLD AUTO: 63.4 % (ref 42.7–76)
NRBC BLD AUTO-RTO: 0.1 /100 WBC (ref 0–0.2)
PLATELET # BLD AUTO: 495 10*3/MM3 (ref 140–450)
PMV BLD AUTO: 11 FL (ref 6–12)
RBC # BLD AUTO: 3.96 10*6/MM3 (ref 3.77–5.28)
WBC NRBC COR # BLD: 7.71 10*3/MM3 (ref 3.4–10.8)

## 2022-05-04 PROCEDURE — 99214 OFFICE O/P EST MOD 30 MIN: CPT | Performed by: NURSE PRACTITIONER

## 2022-05-04 PROCEDURE — 85025 COMPLETE CBC W/AUTO DIFF WBC: CPT | Performed by: NURSE PRACTITIONER

## 2022-05-04 NOTE — PROGRESS NOTES
Transitional Care Follow Up Visit  Subjective     Biju Harrington is a 51 y.o. female who presents for a transitional care management visit.    Within 48 business hours after discharge our office contacted her via telephone to coordinate her care and needs.      I reviewed and discussed the details of that call along with the discharge summary, hospital problems, inpatient lab results, inpatient diagnostic studies, and consultation reports with Biju.     Current outpatient and discharge medications have been reconciled for the patient.  Reviewed by: Suzanne Kimball, GIANA      Date of TCM Phone Call 4/27/2022   [facility]   Date of Admission 4/26/2022   Date of Discharge 4/27/2022   Discharge Disposition Home or Self Care     Risk for Readmission (LACE) Score: 1 (4/27/2022  6:01 AM)      History of Present Illness   Course During Hospital Stay:      Patient presents to the office today for transitional care management following overnight admission to [facility].    She presented to the hospital on 4/26/2022 with complaints of abnormal uterine bleeding. Two weeks prior to presentation she started have a lot of vaginal bleeding, passing clots. She then developed abdominal pain causing her to double over. She was cleaning someone's house and she couldn't stay to finish, so she left, went home and got a heating pad, and tried to lay down. She states she went through 48 pads in under a week.  She was found to be anemic upon presentation with hemoglobin 6.9.  She was transfused 1 unit of PRBC and her hemoglobin improved to 7.9 at the time of discharge.    Her folate was low at 4.75.  B12 was normal, but less than 400.  Her iron was low at 10.  Iron saturation 3%.  Ferritin was low at 5.2.  She is currently prescribed ferrous sulfate 325 once daily but instructed by the hospitalist to not take for one month d/t two iron infusions while admitted. She received 1 B12 shot and then  given B12 1000 mcg daily and folic acid 1 mg daily.    She underwent imaging with transvaginal ultrasound and CT abdomen and pelvis.  CT was performed initially which showed normal appendix, fluid and/or thickening in the endometrial canal extending into the cervix and vagina, which are moderately distended.  Incidental note was made of a cervical inclusion cyst and subendometrial cystic structures suggestive of adenomyosis.  There is small volume free fluid, as well as moderate colonic stool burden, and small fat-containing umbilical hernia.  Degenerative changes of the thoracolumbar spine and pelvis were also noted.    Follow-up transvaginal ultrasound showed a thickened endometrium without a distinct focal endometrial lesion.  Heterogeneous myometrial lesion most likely represents a uterine fibroid.    She has an appointment with GYN next Thursday - she will see Dr. Russlel next week on the 12th.     She is not currently bleeding. She did spot some on Monday, but it wasn't anything really. She is taking norethindrone, but only for 10 days, to stop the bleeding.      The following portions of the patient's history were reviewed and updated as appropriate: allergies, current medications, past family history, past medical history, past social history, past surgical history and problem list.    Review of Systems    Objective   Physical Exam  Vitals reviewed.   Constitutional:       General: She is not in acute distress.     Appearance: Normal appearance. She is well-developed.   HENT:      Head: Normocephalic and atraumatic.   Eyes:      General: No scleral icterus.     Extraocular Movements: Extraocular movements intact.      Conjunctiva/sclera: Conjunctivae normal.   Neck:      Trachea: Trachea normal.   Cardiovascular:      Rate and Rhythm: Normal rate and regular rhythm.      Pulses: Normal pulses.      Heart sounds: No murmur heard.  Pulmonary:      Effort: Pulmonary effort is normal. No respiratory distress.       "Breath sounds: Normal breath sounds. No wheezing, rhonchi or rales.   Musculoskeletal:         General: Normal range of motion.      Right upper arm: Deformity (2\" nodular deformity/lump under the skin in the antecubital space) and tenderness present. No swelling, edema, lacerations or bony tenderness.      Cervical back: Normal range of motion and neck supple.      Right lower leg: No edema.      Left lower leg: No edema.   Lymphadenopathy:      Cervical: No cervical adenopathy.   Skin:     General: Skin is warm and dry.   Neurological:      Mental Status: She is alert and oriented to person, place, and time.   Psychiatric:         Mood and Affect: Mood and affect normal.         Behavior: Behavior normal.         Thought Content: Thought content normal.         Judgment: Judgment normal.       ED to Hosp-Admission (Discharged) with Eleazar Clark MD; Lonnie Kimble MD (04/26/2022)    CBC & Differential (04/26/2022 13:41)  Comprehensive Metabolic Panel (04/26/2022 13:41)  Lipase (04/26/2022 13:41)  hCG, Serum, Qualitative (04/26/2022 13:41)  Manual Differential (04/26/2022 13:41)  Iron Profile (04/26/2022 13:41)  Ferritin (04/26/2022 13:41)  Iron (04/26/2022 13:41)  Reticulocytes (04/26/2022 13:41)  Vitamin B12 (04/26/2022 13:41)  Folate (04/26/2022 13:41)  Protime-INR (04/26/2022 13:41)  Type & Screen (04/26/2022 14:03)  COVID PRE-OP / PRE-PROCEDURE SCREENING ORDER (NO ISOLATION) - Swab, Nasopharynx (04/26/2022 14:05)  Urinalysis With Microscopic If Indicated (No Culture) - Urine, Clean Catch (04/26/2022 14:31)  aPTT (04/26/2022 23:46)  Hemoglobin & Hematocrit, Blood (04/26/2022 23:47)  Hemoglobin & Hematocrit, Blood (04/27/2022 11:25)    CT Abdomen Pelvis With Contrast (04/26/2022 14:47)  US Non-ob Transvaginal (04/26/2022 16:33)    Assessment/Plan   Diagnoses and all orders for this visit:    1. Hospital discharge follow-up (Primary)    2. Dysfunctional uterine bleeding  -     CBC Auto " Differential    3. Iron deficiency anemia due to chronic blood loss  -     CBC Auto Differential    4. Folate deficiency    5. B12 deficiency    6. Pain in right arm  -     Duplex Venous Upper Extremity - Right CAR; Future    7. Skin lump of arm, right  -     Duplex Venous Upper Extremity - Right CAR; Future        Return in about 1 month (around 6/4/2022) for Next scheduled follow up.     I will call her with results of the US once received, as well as repeat CBC - suspect that the palpable deformity of the RUE is a superficial thrombosis from IV placement.

## 2022-05-04 NOTE — PROGRESS NOTES
Venipuncture Blood Specimen Collection  Venipuncture performed in left arm  by Cammie Barone with good hemostasis. Patient tolerated the procedure well without complications.   05/04/22   Cammie Barone

## 2022-05-06 ENCOUNTER — HOSPITAL ENCOUNTER (OUTPATIENT)
Dept: CARDIOLOGY | Facility: HOSPITAL | Age: 52
Discharge: HOME | End: 2022-05-06
Admitting: NURSE PRACTITIONER

## 2022-05-06 DIAGNOSIS — R22.31 SKIN LUMP OF ARM, RIGHT: ICD-10-CM

## 2022-05-06 DIAGNOSIS — M79.601 PAIN IN RIGHT ARM: ICD-10-CM

## 2022-05-06 LAB
BH CV UPPER VENOUS LEFT INTERNAL JUGULAR AUGMENT: NORMAL
BH CV UPPER VENOUS LEFT INTERNAL JUGULAR COMPRESS: NORMAL
BH CV UPPER VENOUS LEFT INTERNAL JUGULAR PHASIC: NORMAL
BH CV UPPER VENOUS LEFT INTERNAL JUGULAR SPONT: NORMAL
BH CV UPPER VENOUS LEFT SUBCLAVIAN AUGMENT: NORMAL
BH CV UPPER VENOUS LEFT SUBCLAVIAN COMPRESS: NORMAL
BH CV UPPER VENOUS LEFT SUBCLAVIAN PHASIC: NORMAL
BH CV UPPER VENOUS LEFT SUBCLAVIAN SPONT: NORMAL
BH CV UPPER VENOUS RIGHT AXILLARY AUGMENT: NORMAL
BH CV UPPER VENOUS RIGHT AXILLARY COMPRESS: NORMAL
BH CV UPPER VENOUS RIGHT AXILLARY PHASIC: NORMAL
BH CV UPPER VENOUS RIGHT AXILLARY SPONT: NORMAL
BH CV UPPER VENOUS RIGHT BASILIC FOREARM COLOR: 1
BH CV UPPER VENOUS RIGHT BASILIC FOREARM COMPRESS: NORMAL
BH CV UPPER VENOUS RIGHT BASILIC FOREARM THROMBUS: NORMAL
BH CV UPPER VENOUS RIGHT BASILIC UPPER COMPRESS: NORMAL
BH CV UPPER VENOUS RIGHT BRACHIAL COMPRESS: NORMAL
BH CV UPPER VENOUS RIGHT CEPHALIC FOREARM COMPRESS: NORMAL
BH CV UPPER VENOUS RIGHT CEPHALIC UPPER COMPRESS: NORMAL
BH CV UPPER VENOUS RIGHT INNOMINATE AUGMENT: NORMAL
BH CV UPPER VENOUS RIGHT INNOMINATE COMPRESS: NORMAL
BH CV UPPER VENOUS RIGHT INNOMINATE PHASIC: NORMAL
BH CV UPPER VENOUS RIGHT INNOMINATE SPONT: NORMAL
BH CV UPPER VENOUS RIGHT INTERNAL JUGULAR AUGMENT: NORMAL
BH CV UPPER VENOUS RIGHT INTERNAL JUGULAR COMPRESS: NORMAL
BH CV UPPER VENOUS RIGHT INTERNAL JUGULAR PHASIC: NORMAL
BH CV UPPER VENOUS RIGHT INTERNAL JUGULAR SPONT: NORMAL
BH CV UPPER VENOUS RIGHT MED CUBITAL COLOR: 1
BH CV UPPER VENOUS RIGHT MED CUBITAL COMPRESS: NORMAL
BH CV UPPER VENOUS RIGHT MED CUBITAL THROMBUS: NORMAL
BH CV UPPER VENOUS RIGHT RADIAL COMPRESS: NORMAL
BH CV UPPER VENOUS RIGHT SUBCLAVIAN AUGMENT: NORMAL
BH CV UPPER VENOUS RIGHT SUBCLAVIAN COMPRESS: NORMAL
BH CV UPPER VENOUS RIGHT SUBCLAVIAN PHASIC: NORMAL
BH CV UPPER VENOUS RIGHT SUBCLAVIAN SPONT: NORMAL
BH CV UPPER VENOUS RIGHT ULNAR COLOR: 1
BH CV UPPER VENOUS RIGHT ULNAR COMPRESS: NORMAL
BH CV UPPER VENOUS RIGHT ULNAR THROMBUS: NORMAL
MAXIMAL PREDICTED HEART RATE: 171 BPM
STRESS TARGET HR: 145 BPM

## 2022-05-06 PROCEDURE — 93971 EXTREMITY STUDY: CPT

## 2022-05-06 NOTE — PROGRESS NOTES
Today's preliminary report is positive for acute deep vein thrombosis in right arm. Acute superficial venous thrombosis in the right arm. Preliminary results were called into .

## 2022-05-10 ENCOUNTER — OFFICE VISIT (OUTPATIENT)
Dept: FAMILY MEDICINE CLINIC | Facility: CLINIC | Age: 52
End: 2022-05-10

## 2022-05-10 VITALS
TEMPERATURE: 97 F | DIASTOLIC BLOOD PRESSURE: 74 MMHG | HEART RATE: 75 BPM | WEIGHT: 139 LBS | OXYGEN SATURATION: 100 % | BODY MASS INDEX: 25.42 KG/M2 | SYSTOLIC BLOOD PRESSURE: 110 MMHG

## 2022-05-10 DIAGNOSIS — F33.2 SEVERE EPISODE OF RECURRENT MAJOR DEPRESSIVE DISORDER, WITHOUT PSYCHOTIC FEATURES: ICD-10-CM

## 2022-05-10 DIAGNOSIS — N93.8 DYSFUNCTIONAL UTERINE BLEEDING: ICD-10-CM

## 2022-05-10 DIAGNOSIS — E53.8 B12 DEFICIENCY: ICD-10-CM

## 2022-05-10 DIAGNOSIS — E53.8 FOLATE DEFICIENCY: ICD-10-CM

## 2022-05-10 DIAGNOSIS — I82.621 ACUTE DEEP VEIN THROMBOSIS (DVT) OF ULNAR VEIN OF RIGHT UPPER EXTREMITY: Primary | ICD-10-CM

## 2022-05-10 DIAGNOSIS — D50.0 IRON DEFICIENCY ANEMIA DUE TO CHRONIC BLOOD LOSS: ICD-10-CM

## 2022-05-10 PROCEDURE — 99214 OFFICE O/P EST MOD 30 MIN: CPT | Performed by: NURSE PRACTITIONER

## 2022-05-10 NOTE — PROGRESS NOTES
"Chief Complaint  Follow-up (Follow up on U/S of arm for blood clot )    Subjective            Biju Harrington presents to [facility]  History of Present Illness     Patient presents to the office today to follow-up on recent right upper extremity ultrasound showing DVT in the ulnar vein and superficial thrombophlebitis of the basilic and median cubital veins.  She was prescribed Eliquis on Friday of last week by another provider due to myself being out of the office; however, she states she went to the pharmacy to pick it up and they said that it had been \"recalled or called back\".  She has not been taking anything for anticoagulation.  She has been using a warm compress to the right upper extremity and has noted that the superficial thrombophlebitis has decreased in size somewhat.    She will see gynecology on Thursday of this week for the abnormal uterine bleeding.  She is taking norethindrone as prescribed, but has had spotting since seeing me last.  She states that she would describe it as a \"light period\".  She has not had any heavy bleeding, nor has she passed any clots.    Her PHQ-9 score is 15 on exam today.  She does not feel like the Effexor is working.  She is prescribed 37.5 mg daily for treatment of labile mood, irritability, and vasomotor symptoms.  She would like to try Zoloft.  She has taken Zoloft in the past and had good results with that.  She was on 150 mg when she stopped taking Zoloft previously.  She currently denies any homicidal ideations or suicidal ideations.  She denies any AVH.    Past Medical History:   Diagnosis Date   • Anxiety and depression    • Hot flashes    • PONV (postoperative nausea and vomiting)        Allergies   Allergen Reactions   • Phenergan [Promethazine] GI Intolerance        Past Surgical History:   Procedure Laterality Date   • CHOLECYSTECTOMY     • COLONOSCOPY N/A 2/25/2022    Procedure: COLONOSCOPY WITH BIOPSY;  Surgeon: Lupe" Leigh Zamarripa MD;  Location: [facility] ENDOSCOPY;  Service: Gastroenterology;  Laterality: N/A;  COLON POLYP, HEMORRHOIDS   • OVARIAN CYST REMOVAL     • ROTATOR CUFF REPAIR Right    • TUBAL ABDOMINAL LIGATION          Social History     Tobacco Use   • Smoking status: Former Smoker     Packs/day: 0.50     Years: 15.00     Pack years: 7.50     Start date:      Quit date: 10/2021     Years since quittin.6   • Smokeless tobacco: Never Used   Vaping Use   • Vaping Use: Never used   Substance Use Topics   • Alcohol use: Yes     Comment: occasionally   • Drug use: Never       Family History   Problem Relation Age of Onset   • Diabetes Father    • Diabetes Maternal Grandmother    • Cancer Paternal Grandfather         colon   • Malig Hyperthermia Neg Hx         Health Maintenance Due   Topic Date Due   • COVID-19 Vaccine (1) Never done   • HEPATITIS C SCREENING  Never done        Current Outpatient Medications on File Prior to Visit   Medication Sig   • acetaminophen (TYLENOL) 325 MG tablet Take 2 tablets by mouth Every 6 (Six) Hours As Needed for Mild Pain .   • docusate sodium (COLACE) 50 MG capsule Take 2 capsules by mouth 2 (Two) Times a Day As Needed for Constipation. OTC   • folic acid (FOLVITE) 1 MG tablet Take 1 tablet by mouth Daily.   • multivitamin with minerals tablet tablet Take 1 tablet by mouth Daily. OTC   • norethindrone (AYGESTIN) 5 MG tablet Take 1 tablet by mouth Daily.   • vitamin B-12 (CYANOCOBALAMIN) 1000 MCG tablet Take 1 tablet by mouth Daily.   • [DISCONTINUED] venlafaxine XR (EFFEXOR-XR) 37.5 MG 24 hr capsule TAKE 1 CAPSULE BY MOUTH DAILY (Patient taking differently: Take 37.5 mg by mouth Daily. DAILY AT DINNER)   • ferrous sulfate (FerrouSul) 325 (65 FE) MG tablet Take 1 tablet by mouth Daily With Breakfast.   • [DISCONTINUED] apixaban (ELIQUIS) 5 MG tablet tablet Take 2 tablets by mouth 2 (Two) Times a Day for 7 days.     No current facility-administered medications on file prior to visit.        Immunization History   Administered Date(s) Administered   • Tdap 07/13/2020       Review of Systems     Objective     /74   Pulse 75   Temp 97 °F (36.1 °C)   Wt 63 kg (139 lb)   SpO2 100%   BMI 25.42 kg/m²       Physical Exam  Vitals reviewed.   Constitutional:       General: She is not in acute distress.     Appearance: Normal appearance. She is well-developed.   HENT:      Head: Normocephalic and atraumatic.   Eyes:      General: No scleral icterus.     Extraocular Movements: Extraocular movements intact.      Conjunctiva/sclera: Conjunctivae normal.   Cardiovascular:      Rate and Rhythm: Normal rate and regular rhythm.      Pulses: Normal pulses.      Heart sounds: No murmur heard.  Pulmonary:      Effort: Pulmonary effort is normal. No respiratory distress.      Breath sounds: Normal breath sounds. No wheezing, rhonchi or rales.   Musculoskeletal:         General: Normal range of motion.      Right lower leg: No edema.      Left lower leg: No edema.      Comments: Right upper extremity still demonstrates approximate 2 inch nodular deformity/lump under the skin in the antecubital space, which is the suspected superficial thrombophlebitis.  Mildly tender on exam.  No swelling, edema, or redness.   Skin:     General: Skin is warm and dry.   Neurological:      Mental Status: She is alert and oriented to person, place, and time.   Psychiatric:         Mood and Affect: Mood and affect normal.         Behavior: Behavior normal.         Thought Content: Thought content normal.         Judgment: Judgment normal.         Result Review :     The following data was reviewed by: GIANA Starr on 05/10/2022:    CBC    CBC 4/26/22 4/26/22 4/27/22 5/4/22    [id number]     WBC 5.23   7.71   RBC 3.26 (A)   3.96   Hemoglobin 6.9 (A) 7.8 (A) 7.9 (A) 9.4 (A)   Hematocrit 24.4 (A) 26.1 (A) 27.8 (A) 33.1 (A)   MCV 74.8 (A)   83.6   MCH 21.2 (A)   23.7 (A)   MCHC 28.3 (A)   28.4 (A)   RDW 19.8 (A)   23.9  (A)   Platelets 336   495 (A)   (A) Abnormal value              Data reviewed: Radiologic studies :   Duplex Venous Upper Extremity - Right CAR (05/06/2022 13:45)         Assessment and Plan      Diagnoses and all orders for this visit:    1. Acute deep vein thrombosis (DVT) of ulnar vein of right upper extremity (HCC) (Primary)  -     Apixaban Starter Pack tablet therapy pack; Take two 5 mg tablets by mouth every 12 hours for 7 days. Followed by one 5 mg tablet every 12 hours. (Dispense starter pack if available)  Dispense: 74 tablet; Refill: 0  -     Ambulatory Referral to Hematology / Oncology    2. Severe episode of recurrent major depressive disorder, without psychotic features (HCC)  -     sertraline (Zoloft) 50 MG tablet; Take 1 tablet by mouth Daily.  Dispense: 30 tablet; Refill: 0    3. Dysfunctional uterine bleeding  -     Ambulatory Referral to Hematology / Oncology    4. Iron deficiency anemia due to chronic blood loss  -     Ambulatory Referral to Hematology / Oncology    5. Folate deficiency  -     Ambulatory Referral to Hematology / Oncology    6. B12 deficiency  -     Ambulatory Referral to Hematology / Oncology            Follow Up     Return in about 1 month (around 6/10/2022).     I am uncertain as to why she did not receive the Eliquis as prescribed.  Perhaps the pharmacy needed a prior authorization.  I am going to reattempt prescribing the Eliquis starter pack today.  She will call me if she is unable to pick this up from the pharmacy and we will find out if it is due to lack of coverage or need for prior authorization.    I did explain to her that Eliquis is a blood thinner, and she is currently experiencing abnormal uterine bleeding.  We will need to monitor this closely.  If she experiences any heavy bleeding and she will need to go to the emergency room.  She will see gynecology on Thursday.  I am also going to refer her to hematology/oncology to further evaluate her from a hematological  standpoint.  She is taking iron, folic acid, and B12 currently.    I will initiate sertraline 50 mg once daily for treatment of her depression.  She will stop Effexor.  Follow-up with me in 1 month to reassess.    Patient was given instructions and counseling regarding her condition or for health maintenance advice. Please see specific information pulled into the AVS if appropriate.     Biju Harrington  reports that she quit smoking about 7 months ago. She started smoking about 33 years ago. She has a 7.50 pack-year smoking history. She has never used smokeless tobacco.

## 2022-05-12 ENCOUNTER — HOSPITAL ENCOUNTER (OUTPATIENT)
Facility: HOSPITAL | Age: 52
Discharge: HOME | End: 2022-05-14
Attending: EMERGENCY MEDICINE | Admitting: STUDENT IN AN ORGANIZED HEALTH CARE EDUCATION/TRAINING PROGRAM

## 2022-05-12 ENCOUNTER — OFFICE VISIT (OUTPATIENT)
Dept: OBSTETRICS AND GYNECOLOGY | Facility: CLINIC | Age: 52
End: 2022-05-12

## 2022-05-12 VITALS
WEIGHT: 138 LBS | BODY MASS INDEX: 25.4 KG/M2 | DIASTOLIC BLOOD PRESSURE: 82 MMHG | HEIGHT: 62 IN | SYSTOLIC BLOOD PRESSURE: 126 MMHG

## 2022-05-12 DIAGNOSIS — D25.9 UTERINE LEIOMYOMA, UNSPECIFIED LOCATION: ICD-10-CM

## 2022-05-12 DIAGNOSIS — N93.8 UTERINE BLEEDING, DYSFUNCTIONAL: ICD-10-CM

## 2022-05-12 DIAGNOSIS — D25.1 INTRAMURAL LEIOMYOMA OF UTERUS: ICD-10-CM

## 2022-05-12 DIAGNOSIS — N93.8 DUB (DYSFUNCTIONAL UTERINE BLEEDING): Primary | ICD-10-CM

## 2022-05-12 DIAGNOSIS — D64.9 ANEMIA, UNSPECIFIED TYPE: ICD-10-CM

## 2022-05-12 DIAGNOSIS — I82.621 ACUTE DEEP VEIN THROMBOSIS (DVT) OF ULNAR VEIN OF RIGHT UPPER EXTREMITY: ICD-10-CM

## 2022-05-12 DIAGNOSIS — N93.9 ABNORMAL UTERINE BLEEDING (AUB): Primary | ICD-10-CM

## 2022-05-12 DIAGNOSIS — N93.9 ABNORMAL UTERINE BLEEDING: ICD-10-CM

## 2022-05-12 LAB
ABO GROUP BLD: NORMAL
ALBUMIN SERPL-MCNC: 4.2 G/DL (ref 3.5–5.2)
ALBUMIN/GLOB SERPL: 1.8 G/DL
ALP SERPL-CCNC: 34 U/L (ref 39–117)
ALT SERPL W P-5'-P-CCNC: 10 U/L (ref 1–33)
ANION GAP SERPL CALCULATED.3IONS-SCNC: 10 MMOL/L (ref 5–15)
APTT PPP: 29.8 SECONDS (ref 22.7–35.4)
AST SERPL-CCNC: 14 U/L (ref 1–32)
BASOPHILS # BLD AUTO: 0.1 10*3/MM3 (ref 0–0.2)
BASOPHILS NFR BLD AUTO: 1.7 % (ref 0–1.5)
BILIRUB SERPL-MCNC: 0.2 MG/DL (ref 0–1.2)
BLD GP AB SCN SERPL QL: NEGATIVE
BUN SERPL-MCNC: 10 MG/DL (ref 6–20)
BUN/CREAT SERPL: 14.1 (ref 7–25)
CALCIUM SPEC-SCNC: 8.8 MG/DL (ref 8.6–10.5)
CHLORIDE SERPL-SCNC: 107 MMOL/L (ref 98–107)
CO2 SERPL-SCNC: 23 MMOL/L (ref 22–29)
CREAT SERPL-MCNC: 0.71 MG/DL (ref 0.57–1)
DEPRECATED RDW RBC AUTO: 71.1 FL (ref 37–54)
EGFRCR SERPLBLD CKD-EPI 2021: 104.4 ML/MIN/1.73
EOSINOPHIL # BLD AUTO: 0.08 10*3/MM3 (ref 0–0.4)
EOSINOPHIL NFR BLD AUTO: 1.3 % (ref 0.3–6.2)
ERYTHROCYTE [DISTWIDTH] IN BLOOD BY AUTOMATED COUNT: 25.2 % (ref 12.3–15.4)
GLOBULIN UR ELPH-MCNC: 2.3 GM/DL
GLUCOSE SERPL-MCNC: 138 MG/DL (ref 65–99)
HCG SERPL QL: NEGATIVE
HCT VFR BLD AUTO: 28.1 % (ref 34–46.6)
HGB BLD-MCNC: 8.8 G/DL (ref 12–15.9)
INR PPP: 1.12 (ref 0.9–1.1)
LYMPHOCYTES # BLD AUTO: 2.04 10*3/MM3 (ref 0.7–3.1)
LYMPHOCYTES NFR BLD AUTO: 33.7 % (ref 19.6–45.3)
MCH RBC QN AUTO: 25.4 PG (ref 26.6–33)
MCHC RBC AUTO-ENTMCNC: 31.3 G/DL (ref 31.5–35.7)
MCV RBC AUTO: 81.2 FL (ref 79–97)
MONOCYTES # BLD AUTO: 0.36 10*3/MM3 (ref 0.1–0.9)
MONOCYTES NFR BLD AUTO: 5.9 % (ref 5–12)
NEUTROPHILS NFR BLD AUTO: 3.47 10*3/MM3 (ref 1.7–7)
NEUTROPHILS NFR BLD AUTO: 57.2 % (ref 42.7–76)
PLATELET # BLD AUTO: 421 10*3/MM3 (ref 140–450)
PMV BLD AUTO: 10.9 FL (ref 6–12)
POTASSIUM SERPL-SCNC: 4 MMOL/L (ref 3.5–5.2)
PROT SERPL-MCNC: 6.5 G/DL (ref 6–8.5)
PROTHROMBIN TIME: 14.4 SECONDS (ref 11.7–14.2)
RBC # BLD AUTO: 3.46 10*6/MM3 (ref 3.77–5.28)
RH BLD: POSITIVE
SARS-COV-2 RNA RESP QL NAA+PROBE: NOT DETECTED
SODIUM SERPL-SCNC: 140 MMOL/L (ref 136–145)
T&S EXPIRATION DATE: NORMAL
WBC NRBC COR # BLD: 6.06 10*3/MM3 (ref 3.4–10.8)

## 2022-05-12 PROCEDURE — 86850 RBC ANTIBODY SCREEN: CPT | Performed by: EMERGENCY MEDICINE

## 2022-05-12 PROCEDURE — 86901 BLOOD TYPING SEROLOGIC RH(D): CPT | Performed by: EMERGENCY MEDICINE

## 2022-05-12 PROCEDURE — G0378 HOSPITAL OBSERVATION PER HR: HCPCS

## 2022-05-12 PROCEDURE — 86923 COMPATIBILITY TEST ELECTRIC: CPT

## 2022-05-12 PROCEDURE — 99219 PR INITIAL OBSERVATION CARE/DAY 50 MINUTES: CPT | Performed by: OBSTETRICS & GYNECOLOGY

## 2022-05-12 PROCEDURE — 99284 EMERGENCY DEPT VISIT MOD MDM: CPT

## 2022-05-12 PROCEDURE — U0003 INFECTIOUS AGENT DETECTION BY NUCLEIC ACID (DNA OR RNA); SEVERE ACUTE RESPIRATORY SYNDROME CORONAVIRUS 2 (SARS-COV-2) (CORONAVIRUS DISEASE [COVID-19]), AMPLIFIED PROBE TECHNIQUE, MAKING USE OF HIGH THROUGHPUT TECHNOLOGIES AS DESCRIBED BY CMS-2020-01-R: HCPCS | Performed by: EMERGENCY MEDICINE

## 2022-05-12 PROCEDURE — 85730 THROMBOPLASTIN TIME PARTIAL: CPT | Performed by: EMERGENCY MEDICINE

## 2022-05-12 PROCEDURE — 99203 OFFICE O/P NEW LOW 30 MIN: CPT | Performed by: STUDENT IN AN ORGANIZED HEALTH CARE EDUCATION/TRAINING PROGRAM

## 2022-05-12 PROCEDURE — C9803 HOPD COVID-19 SPEC COLLECT: HCPCS

## 2022-05-12 PROCEDURE — 80053 COMPREHEN METABOLIC PANEL: CPT | Performed by: EMERGENCY MEDICINE

## 2022-05-12 PROCEDURE — 86900 BLOOD TYPING SEROLOGIC ABO: CPT | Performed by: EMERGENCY MEDICINE

## 2022-05-12 PROCEDURE — 84703 CHORIONIC GONADOTROPIN ASSAY: CPT | Performed by: EMERGENCY MEDICINE

## 2022-05-12 PROCEDURE — 85610 PROTHROMBIN TIME: CPT | Performed by: EMERGENCY MEDICINE

## 2022-05-12 PROCEDURE — 85025 COMPLETE CBC W/AUTO DIFF WBC: CPT | Performed by: EMERGENCY MEDICINE

## 2022-05-12 RX ORDER — CHOLECALCIFEROL (VITAMIN D3) 125 MCG
1000 CAPSULE ORAL DAILY
Status: DISCONTINUED | OUTPATIENT
Start: 2022-05-13 | End: 2022-05-14 | Stop reason: HOSPADM

## 2022-05-12 RX ORDER — ONDANSETRON 2 MG/ML
4 INJECTION INTRAMUSCULAR; INTRAVENOUS EVERY 6 HOURS PRN
Status: DISCONTINUED | OUTPATIENT
Start: 2022-05-12 | End: 2022-05-13 | Stop reason: SDUPTHER

## 2022-05-12 RX ORDER — FOLIC ACID 1 MG/1
1 TABLET ORAL DAILY
Status: DISCONTINUED | OUTPATIENT
Start: 2022-05-13 | End: 2022-05-14 | Stop reason: HOSPADM

## 2022-05-12 RX ORDER — MULTIPLE VITAMINS W/ MINERALS TAB 9MG-400MCG
1 TAB ORAL DAILY
Status: DISCONTINUED | OUTPATIENT
Start: 2022-05-13 | End: 2022-05-13

## 2022-05-12 RX ORDER — SODIUM CHLORIDE 0.9 % (FLUSH) 0.9 %
10 SYRINGE (ML) INJECTION AS NEEDED
Status: DISCONTINUED | OUTPATIENT
Start: 2022-05-12 | End: 2022-05-14 | Stop reason: HOSPADM

## 2022-05-12 RX ORDER — ACETAMINOPHEN 325 MG/1
650 TABLET ORAL EVERY 4 HOURS PRN
Status: DISCONTINUED | OUTPATIENT
Start: 2022-05-12 | End: 2022-05-13

## 2022-05-12 RX ORDER — SODIUM CHLORIDE, SODIUM LACTATE, POTASSIUM CHLORIDE, CALCIUM CHLORIDE 600; 310; 30; 20 MG/100ML; MG/100ML; MG/100ML; MG/100ML
100 INJECTION, SOLUTION INTRAVENOUS CONTINUOUS
Status: DISCONTINUED | OUTPATIENT
Start: 2022-05-13 | End: 2022-05-13

## 2022-05-12 RX ORDER — NITROGLYCERIN 0.4 MG/1
0.4 TABLET SUBLINGUAL
Status: DISCONTINUED | OUTPATIENT
Start: 2022-05-12 | End: 2022-05-13

## 2022-05-12 RX ORDER — SODIUM CHLORIDE 0.9 % (FLUSH) 0.9 %
10 SYRINGE (ML) INJECTION EVERY 12 HOURS SCHEDULED
Status: DISCONTINUED | OUTPATIENT
Start: 2022-05-12 | End: 2022-05-13

## 2022-05-12 RX ORDER — FERROUS SULFATE 325(65) MG
325 TABLET ORAL
Status: DISCONTINUED | OUTPATIENT
Start: 2022-05-13 | End: 2022-05-14 | Stop reason: HOSPADM

## 2022-05-12 RX ORDER — ONDANSETRON 4 MG/1
4 TABLET, FILM COATED ORAL EVERY 6 HOURS PRN
Status: DISCONTINUED | OUTPATIENT
Start: 2022-05-12 | End: 2022-05-13 | Stop reason: SDUPTHER

## 2022-05-12 RX ORDER — CHOLECALCIFEROL (VITAMIN D3) 125 MCG
5 CAPSULE ORAL NIGHTLY PRN
Status: DISCONTINUED | OUTPATIENT
Start: 2022-05-12 | End: 2022-05-13

## 2022-05-12 RX ORDER — SODIUM CHLORIDE 0.9 % (FLUSH) 0.9 %
10 SYRINGE (ML) INJECTION AS NEEDED
Status: DISCONTINUED | OUTPATIENT
Start: 2022-05-12 | End: 2022-05-13

## 2022-05-12 NOTE — ED TRIAGE NOTES
Pt states that she has had heavy vaginal bleeding for since Sunday. Reports that she was seen for the same issues 2 weeks ago. Bleeding stopped then but pt developed a DVT in her arm from the IV insertion. Pt was placed on a blood thinner. Pt states that she is using 3-4 pads an hour and passing large clots. Reports intermittent pains.     Patient masked at arrival and triage staff wore all appropriate PPE during entire encounter with patient.

## 2022-05-12 NOTE — PROGRESS NOTES
Chief Complaint   Patient presents with   • [facility] 2 weeks ago heavy bleeding and still bleeding heavy.On blood thinners.Had iron infusion and blood transfusion.        SUBJECTIVE:     Biju Harrington is a 51 y.o.  who presents for follow up of abnormal uterine bleeding. She reports over the last year and half, she has experienced more irregular periods that are very heavy and passing clots. She has ruined clothes that she has bleed onto and bleed down to the mattress pad on occasion. She was recently hospitalized on 22 for severe anemia secondary to heavy vaginal bleeding with Hgb 6.9 on admission that required a blood transfusion and iron transfusion. While there got ultrasound that demonstrated thickened endometrium without focal lesion and heterogeneous echogenicity in the myometrium that could represent uterine fibroid vs adenomyosis. She was evaluated by the OB/GYN Hospitalist and sent home on Aygestin 5 mg daily. Upon discharge from the hospital, the patient had stopped bleeding but started bleeding again within one week of discharge despite being on Aygestin.   She also was diagnosed with a deep vein thrombosis of the ulnar vein of the right arm and superficial thrombophlebitis of the basilic and median cubital veins on 22 following complaints of right arm pain following hospital admission. She was started on Apixaban but did not take it today because she is bleeding very heavy today.     Menarche at age 13.   Regular menses until about a year and half ago.     Contraception: Tubal ligation     Denies history of STDs.   Reports history of cervical dysplasia and thinks she has had a LEEP. Pap smear- 23- NILM, HPV negative.     OB Hx:     x 2, 23 year old and 28 year old  Largest baby weighed 8 lb 1 oz    Past Medical History:   Diagnosis Date   • Anxiety and depression    • Hot flashes    • Migraine    • PONV (postoperative nausea and vomiting)       Past Surgical  "History:   Procedure Laterality Date   • CHOLECYSTECTOMY     • COLONOSCOPY N/A 2022    Procedure: COLONOSCOPY WITH BIOPSY;  Surgeon: Leigh Andrade MD;  Location: [facility] ENDOSCOPY;  Service: Gastroenterology;  Laterality: N/A;  COLON POLYP, HEMORRHOIDS   • OVARIAN CYST REMOVAL     • ROTATOR CUFF REPAIR Right    • TUBAL ABDOMINAL LIGATION        Social History     Tobacco Use   • Smoking status: Former Smoker     Packs/day: 0.50     Years: 15.00     Pack years: 7.50     Start date:      Quit date: 10/2021     Years since quittin.6   • Smokeless tobacco: Never Used   Vaping Use   • Vaping Use: Never used   Substance Use Topics   • Alcohol use: Yes     Comment: occasionally   • Drug use: Never     OB History   No obstetric history on file.        Review of Systems   Genitourinary: Positive for menstrual problem and vaginal bleeding. Negative for vaginal discharge.       OBJECTIVE:   Vitals:    22 1040   BP: 126/82   Weight: 62.6 kg (138 lb)   Height: 157.5 cm (62.01\")        Physical Exam  Vitals reviewed.   Constitutional:       General: She is not in acute distress.  HENT:      Head: Normocephalic and atraumatic.      Right Ear: External ear normal.      Left Ear: External ear normal.   Eyes:      Extraocular Movements: Extraocular movements intact.      Pupils: Pupils are equal, round, and reactive to light.   Pulmonary:      Effort: Pulmonary effort is normal. No respiratory distress.   Genitourinary:     Comments: Deferred due to heavy vaginal bleeding  Musculoskeletal:         General: No deformity. Normal range of motion.      Cervical back: Normal range of motion and neck supple.   Skin:     General: Skin is warm and dry.   Neurological:      General: No focal deficit present.      Mental Status: She is alert and oriented to person, place, and time.   Psychiatric:         Mood and Affect: Mood normal.         Behavior: Behavior normal.         ASSESSMENT:     ICD-10-CM ICD-9-CM   1. " Abnormal uterine bleeding (AUB)  N93.9 626.9       PLAN:   - I reviewed her hospital record including ultrasound images of the pelvis and her hospital course. She is currently taking Aygestin 5 mg daily for management of her bleeding that has become heavy again today. Recommended she increase to 10 mg daily at this time until she is evaluated by Hem/Onc on Monday for her acute DVT of the right arm.   - We discussed options for treatment of AUB- fibroids and possibly adenomyosis including but not limited to Aygestin (hormonal pill- progesterone),  Lupron, Depo Provera, Mirena IUD, and surgical management which includes hysteroscopy, D&C, possible myomectomy, endometrial ablation and hysterectomy. The patient is interested in surgical management, specifically hysterectomy since she has had a tubal ligation and would like definitive management. However, she has recently had an acute DVT diagnosed and has been placed on anticoagulation (which is likely exacerbating the vaginal bleeding) and major surgery would be inadvisable until the patient is evaluated by Hem/Onc and off anticoagulation if possible. Patient indicates understanding of balancing risk of heavy bleeding and acute DVT and will await Hem/Onc recommendations.   - Recommended the patient return for endometrial biopsy to rule out endometrial hyperplasia and malignancy (which again I feel are low risk given pelvic ultrasound showing likely fibroid vs adenomyosis) but deferred today due to heavy vaginal bleeding and risk of inadequate sampling.   - Will have patient return in 2 weeks for EMB and continue on Aygestin but increase to 10 mg daily. If bleeding starts to worsen, will send Lupron injection to pharmacy for patient to bring in for administration at time of EMB so that it will put the ovaries in a quiescent state and lead to likely menopausal symptoms.   - Patient agrees with above plan of care and all questions answered.       Return in about 2 weeks  (around 5/26/2022) for EMB.    Jia Russell MD

## 2022-05-13 ENCOUNTER — ANESTHESIA (OUTPATIENT)
Dept: PERIOP | Facility: HOSPITAL | Age: 52
End: 2022-05-13

## 2022-05-13 ENCOUNTER — ANESTHESIA EVENT (OUTPATIENT)
Dept: PERIOP | Facility: HOSPITAL | Age: 52
End: 2022-05-13

## 2022-05-13 PROBLEM — I82.621: Status: ACTIVE | Noted: 2022-05-13

## 2022-05-13 PROBLEM — D64.9 ANEMIA: Status: ACTIVE | Noted: 2022-05-13

## 2022-05-13 LAB
ANION GAP SERPL CALCULATED.3IONS-SCNC: 9 MMOL/L (ref 5–15)
ANISOCYTOSIS BLD QL: NORMAL
BUN SERPL-MCNC: 10 MG/DL (ref 6–20)
BUN/CREAT SERPL: 15.2 (ref 7–25)
CALCIUM SPEC-SCNC: 7.9 MG/DL (ref 8.6–10.5)
CHLORIDE SERPL-SCNC: 109 MMOL/L (ref 98–107)
CO2 SERPL-SCNC: 21 MMOL/L (ref 22–29)
CREAT SERPL-MCNC: 0.66 MG/DL (ref 0.57–1)
DEPRECATED RDW RBC AUTO: 78.3 FL (ref 37–54)
EGFRCR SERPLBLD CKD-EPI 2021: 107.7 ML/MIN/1.73
EOSINOPHIL # BLD MANUAL: 0.2 10*3/MM3 (ref 0–0.4)
EOSINOPHIL NFR BLD MANUAL: 3 % (ref 0.3–6.2)
ERYTHROCYTE [DISTWIDTH] IN BLOOD BY AUTOMATED COUNT: 25.3 % (ref 12.3–15.4)
GLUCOSE SERPL-MCNC: 107 MG/DL (ref 65–99)
HCT VFR BLD AUTO: 24.5 % (ref 34–46.6)
HCT VFR BLD AUTO: 24.5 % (ref 34–46.6)
HCT VFR BLD AUTO: 24.7 % (ref 34–46.6)
HCT VFR BLD AUTO: 25.5 % (ref 34–46.6)
HCT VFR BLD AUTO: 26.2 % (ref 34–46.6)
HGB BLD-MCNC: 7.1 G/DL (ref 12–15.9)
HGB BLD-MCNC: 7.2 G/DL (ref 12–15.9)
HGB BLD-MCNC: 7.8 G/DL (ref 12–15.9)
HGB BLD-MCNC: 8.1 G/DL (ref 12–15.9)
HGB BLD-MCNC: 8.1 G/DL (ref 12–15.9)
HYPOCHROMIA BLD QL: NORMAL
LYMPHOCYTES # BLD MANUAL: 2.8 10*3/MM3 (ref 0.7–3.1)
LYMPHOCYTES NFR BLD MANUAL: 5.1 % (ref 5–12)
MCH RBC QN AUTO: 25.1 PG (ref 26.6–33)
MCHC RBC AUTO-ENTMCNC: 28.7 G/DL (ref 31.5–35.7)
MCV RBC AUTO: 87.3 FL (ref 79–97)
MICROCYTES BLD QL: NORMAL
MONOCYTES # BLD: 0.34 10*3/MM3 (ref 0.1–0.9)
NEUTROPHILS # BLD AUTO: 3.41 10*3/MM3 (ref 1.7–7)
NEUTROPHILS NFR BLD MANUAL: 50.5 % (ref 42.7–76)
NRBC BLD AUTO-RTO: 0 /100 WBC (ref 0–0.2)
PLAT MORPH BLD: NORMAL
PLATELET # BLD AUTO: 335 10*3/MM3 (ref 140–450)
PMV BLD AUTO: 11.2 FL (ref 6–12)
POIKILOCYTOSIS BLD QL SMEAR: NORMAL
POLYCHROMASIA BLD QL SMEAR: NORMAL
POTASSIUM SERPL-SCNC: 4.1 MMOL/L (ref 3.5–5.2)
RBC # BLD AUTO: 2.83 10*6/MM3 (ref 3.77–5.28)
SCHISTOCYTES BLD QL SMEAR: NORMAL
SODIUM SERPL-SCNC: 139 MMOL/L (ref 136–145)
VARIANT LYMPHS NFR BLD MANUAL: 41.4 % (ref 19.6–45.3)
WBC MORPH BLD: NORMAL
WBC NRBC COR # BLD: 6.76 10*3/MM3 (ref 3.4–10.8)

## 2022-05-13 PROCEDURE — 85025 COMPLETE CBC W/AUTO DIFF WBC: CPT | Performed by: OBSTETRICS & GYNECOLOGY

## 2022-05-13 PROCEDURE — P9016 RBC LEUKOCYTES REDUCED: HCPCS

## 2022-05-13 PROCEDURE — 99224 PR SBSQ OBSERVATION CARE/DAY 15 MINUTES: CPT | Performed by: STUDENT IN AN ORGANIZED HEALTH CARE EDUCATION/TRAINING PROGRAM

## 2022-05-13 PROCEDURE — 25010000002 PROPOFOL 10 MG/ML EMULSION: Performed by: NURSE ANESTHETIST, CERTIFIED REGISTERED

## 2022-05-13 PROCEDURE — 85014 HEMATOCRIT: CPT | Performed by: PHYSICIAN ASSISTANT

## 2022-05-13 PROCEDURE — C1782 MORCELLATOR: HCPCS | Performed by: STUDENT IN AN ORGANIZED HEALTH CARE EDUCATION/TRAINING PROGRAM

## 2022-05-13 PROCEDURE — 85018 HEMOGLOBIN: CPT | Performed by: PHYSICIAN ASSISTANT

## 2022-05-13 PROCEDURE — G0378 HOSPITAL OBSERVATION PER HR: HCPCS

## 2022-05-13 PROCEDURE — 25010000002 FENTANYL CITRATE (PF) 50 MCG/ML SOLUTION: Performed by: NURSE ANESTHETIST, CERTIFIED REGISTERED

## 2022-05-13 PROCEDURE — 88331 PATH CONSLTJ SURG 1 BLK 1SPC: CPT | Performed by: STUDENT IN AN ORGANIZED HEALTH CARE EDUCATION/TRAINING PROGRAM

## 2022-05-13 PROCEDURE — 85007 BL SMEAR W/DIFF WBC COUNT: CPT | Performed by: OBSTETRICS & GYNECOLOGY

## 2022-05-13 PROCEDURE — 85018 HEMOGLOBIN: CPT

## 2022-05-13 PROCEDURE — 85014 HEMATOCRIT: CPT

## 2022-05-13 PROCEDURE — 88305 TISSUE EXAM BY PATHOLOGIST: CPT | Performed by: STUDENT IN AN ORGANIZED HEALTH CARE EDUCATION/TRAINING PROGRAM

## 2022-05-13 PROCEDURE — 80048 BASIC METABOLIC PNL TOTAL CA: CPT | Performed by: PHYSICIAN ASSISTANT

## 2022-05-13 PROCEDURE — 58563 HYSTEROSCOPY ABLATION: CPT | Performed by: STUDENT IN AN ORGANIZED HEALTH CARE EDUCATION/TRAINING PROGRAM

## 2022-05-13 PROCEDURE — 36430 TRANSFUSION BLD/BLD COMPNT: CPT

## 2022-05-13 PROCEDURE — 25010000002 DEXAMETHASONE PER 1 MG: Performed by: NURSE ANESTHETIST, CERTIFIED REGISTERED

## 2022-05-13 PROCEDURE — 86900 BLOOD TYPING SEROLOGIC ABO: CPT

## 2022-05-13 PROCEDURE — 25010000002 MIDAZOLAM PER 1 MG: Performed by: ANESTHESIOLOGY

## 2022-05-13 RX ORDER — FENTANYL CITRATE 50 UG/ML
50 INJECTION, SOLUTION INTRAMUSCULAR; INTRAVENOUS
Status: DISCONTINUED | OUTPATIENT
Start: 2022-05-13 | End: 2022-05-13 | Stop reason: HOSPADM

## 2022-05-13 RX ORDER — ONDANSETRON 2 MG/ML
4 INJECTION INTRAMUSCULAR; INTRAVENOUS ONCE AS NEEDED
Status: DISCONTINUED | OUTPATIENT
Start: 2022-05-13 | End: 2022-05-13 | Stop reason: HOSPADM

## 2022-05-13 RX ORDER — NALOXONE HCL 0.4 MG/ML
0.2 VIAL (ML) INJECTION AS NEEDED
Status: DISCONTINUED | OUTPATIENT
Start: 2022-05-13 | End: 2022-05-13 | Stop reason: HOSPADM

## 2022-05-13 RX ORDER — LIDOCAINE HYDROCHLORIDE 10 MG/ML
0.5 INJECTION, SOLUTION EPIDURAL; INFILTRATION; INTRACAUDAL; PERINEURAL ONCE AS NEEDED
Status: DISCONTINUED | OUTPATIENT
Start: 2022-05-13 | End: 2022-05-13 | Stop reason: HOSPADM

## 2022-05-13 RX ORDER — SODIUM CHLORIDE 0.9 % (FLUSH) 0.9 %
3 SYRINGE (ML) INJECTION EVERY 12 HOURS SCHEDULED
Status: DISCONTINUED | OUTPATIENT
Start: 2022-05-13 | End: 2022-05-13 | Stop reason: HOSPADM

## 2022-05-13 RX ORDER — FAMOTIDINE 10 MG/ML
20 INJECTION, SOLUTION INTRAVENOUS ONCE
Status: COMPLETED | OUTPATIENT
Start: 2022-05-13 | End: 2022-05-13

## 2022-05-13 RX ORDER — PROPOFOL 10 MG/ML
VIAL (ML) INTRAVENOUS AS NEEDED
Status: DISCONTINUED | OUTPATIENT
Start: 2022-05-13 | End: 2022-05-13 | Stop reason: SURG

## 2022-05-13 RX ORDER — SODIUM CHLORIDE 0.9 % (FLUSH) 0.9 %
3-10 SYRINGE (ML) INJECTION AS NEEDED
Status: DISCONTINUED | OUTPATIENT
Start: 2022-05-13 | End: 2022-05-13 | Stop reason: HOSPADM

## 2022-05-13 RX ORDER — HYDRALAZINE HYDROCHLORIDE 20 MG/ML
5 INJECTION INTRAMUSCULAR; INTRAVENOUS
Status: DISCONTINUED | OUTPATIENT
Start: 2022-05-13 | End: 2022-05-13 | Stop reason: HOSPADM

## 2022-05-13 RX ORDER — OXYCODONE AND ACETAMINOPHEN 7.5; 325 MG/1; MG/1
1 TABLET ORAL EVERY 4 HOURS PRN
Status: DISCONTINUED | OUTPATIENT
Start: 2022-05-13 | End: 2022-05-13 | Stop reason: HOSPADM

## 2022-05-13 RX ORDER — HYDROCODONE BITARTRATE AND ACETAMINOPHEN 7.5; 325 MG/1; MG/1
2 TABLET ORAL EVERY 4 HOURS PRN
Status: DISCONTINUED | OUTPATIENT
Start: 2022-05-13 | End: 2022-05-14 | Stop reason: HOSPADM

## 2022-05-13 RX ORDER — DIPHENHYDRAMINE HYDROCHLORIDE 50 MG/ML
25 INJECTION INTRAMUSCULAR; INTRAVENOUS NIGHTLY PRN
Status: DISCONTINUED | OUTPATIENT
Start: 2022-05-13 | End: 2022-05-14 | Stop reason: HOSPADM

## 2022-05-13 RX ORDER — HYDROCODONE BITARTRATE AND ACETAMINOPHEN 7.5; 325 MG/1; MG/1
1 TABLET ORAL ONCE AS NEEDED
Status: DISCONTINUED | OUTPATIENT
Start: 2022-05-13 | End: 2022-05-13 | Stop reason: HOSPADM

## 2022-05-13 RX ORDER — HYDROMORPHONE HYDROCHLORIDE 1 MG/ML
0.5 INJECTION, SOLUTION INTRAMUSCULAR; INTRAVENOUS; SUBCUTANEOUS
Status: DISCONTINUED | OUTPATIENT
Start: 2022-05-13 | End: 2022-05-13 | Stop reason: HOSPADM

## 2022-05-13 RX ORDER — HYDROCODONE BITARTRATE AND ACETAMINOPHEN 5; 325 MG/1; MG/1
1 TABLET ORAL EVERY 4 HOURS PRN
Status: DISCONTINUED | OUTPATIENT
Start: 2022-05-13 | End: 2022-05-14 | Stop reason: HOSPADM

## 2022-05-13 RX ORDER — LABETALOL HYDROCHLORIDE 5 MG/ML
5 INJECTION, SOLUTION INTRAVENOUS
Status: DISCONTINUED | OUTPATIENT
Start: 2022-05-13 | End: 2022-05-13 | Stop reason: HOSPADM

## 2022-05-13 RX ORDER — DIPHENHYDRAMINE HYDROCHLORIDE 50 MG/ML
12.5 INJECTION INTRAMUSCULAR; INTRAVENOUS
Status: DISCONTINUED | OUTPATIENT
Start: 2022-05-13 | End: 2022-05-13 | Stop reason: HOSPADM

## 2022-05-13 RX ORDER — SODIUM CHLORIDE, SODIUM LACTATE, POTASSIUM CHLORIDE, CALCIUM CHLORIDE 600; 310; 30; 20 MG/100ML; MG/100ML; MG/100ML; MG/100ML
9 INJECTION, SOLUTION INTRAVENOUS CONTINUOUS
Status: DISCONTINUED | OUTPATIENT
Start: 2022-05-13 | End: 2022-05-13

## 2022-05-13 RX ORDER — ONDANSETRON 4 MG/1
4 TABLET, FILM COATED ORAL EVERY 6 HOURS PRN
Status: DISCONTINUED | OUTPATIENT
Start: 2022-05-13 | End: 2022-05-14 | Stop reason: HOSPADM

## 2022-05-13 RX ORDER — FLUMAZENIL 0.1 MG/ML
0.2 INJECTION INTRAVENOUS AS NEEDED
Status: DISCONTINUED | OUTPATIENT
Start: 2022-05-13 | End: 2022-05-13 | Stop reason: HOSPADM

## 2022-05-13 RX ORDER — DEXAMETHASONE SODIUM PHOSPHATE 10 MG/ML
INJECTION INTRAMUSCULAR; INTRAVENOUS AS NEEDED
Status: DISCONTINUED | OUTPATIENT
Start: 2022-05-13 | End: 2022-05-13 | Stop reason: SURG

## 2022-05-13 RX ORDER — ONDANSETRON 2 MG/ML
4 INJECTION INTRAMUSCULAR; INTRAVENOUS EVERY 6 HOURS PRN
Status: DISCONTINUED | OUTPATIENT
Start: 2022-05-13 | End: 2022-05-14 | Stop reason: HOSPADM

## 2022-05-13 RX ORDER — MIDAZOLAM HYDROCHLORIDE 1 MG/ML
1 INJECTION INTRAMUSCULAR; INTRAVENOUS
Status: DISCONTINUED | OUTPATIENT
Start: 2022-05-13 | End: 2022-05-13 | Stop reason: HOSPADM

## 2022-05-13 RX ORDER — EPHEDRINE SULFATE 50 MG/ML
5 INJECTION, SOLUTION INTRAVENOUS ONCE AS NEEDED
Status: DISCONTINUED | OUTPATIENT
Start: 2022-05-13 | End: 2022-05-13 | Stop reason: HOSPADM

## 2022-05-13 RX ORDER — ACETAMINOPHEN 325 MG/1
650 TABLET ORAL ONCE AS NEEDED
Status: DISCONTINUED | OUTPATIENT
Start: 2022-05-13 | End: 2022-05-13 | Stop reason: HOSPADM

## 2022-05-13 RX ORDER — DIPHENHYDRAMINE HCL 25 MG
25 CAPSULE ORAL
Status: DISCONTINUED | OUTPATIENT
Start: 2022-05-13 | End: 2022-05-13 | Stop reason: HOSPADM

## 2022-05-13 RX ORDER — PROMETHAZINE HYDROCHLORIDE 25 MG/1
25 SUPPOSITORY RECTAL ONCE AS NEEDED
Status: DISCONTINUED | OUTPATIENT
Start: 2022-05-13 | End: 2022-05-13 | Stop reason: HOSPADM

## 2022-05-13 RX ORDER — PROMETHAZINE HYDROCHLORIDE 25 MG/1
25 TABLET ORAL ONCE AS NEEDED
Status: DISCONTINUED | OUTPATIENT
Start: 2022-05-13 | End: 2022-05-13 | Stop reason: HOSPADM

## 2022-05-13 RX ORDER — DOCUSATE SODIUM 100 MG/1
100 CAPSULE, LIQUID FILLED ORAL 2 TIMES DAILY PRN
Status: DISCONTINUED | OUTPATIENT
Start: 2022-05-13 | End: 2022-05-14 | Stop reason: HOSPADM

## 2022-05-13 RX ORDER — DIPHENHYDRAMINE HCL 25 MG
25 CAPSULE ORAL NIGHTLY PRN
Status: DISCONTINUED | OUTPATIENT
Start: 2022-05-13 | End: 2022-05-14 | Stop reason: HOSPADM

## 2022-05-13 RX ORDER — FENTANYL CITRATE 50 UG/ML
INJECTION, SOLUTION INTRAMUSCULAR; INTRAVENOUS AS NEEDED
Status: DISCONTINUED | OUTPATIENT
Start: 2022-05-13 | End: 2022-05-13 | Stop reason: SURG

## 2022-05-13 RX ORDER — LIDOCAINE HYDROCHLORIDE 20 MG/ML
INJECTION, SOLUTION INFILTRATION; PERINEURAL AS NEEDED
Status: DISCONTINUED | OUTPATIENT
Start: 2022-05-13 | End: 2022-05-13 | Stop reason: SURG

## 2022-05-13 RX ORDER — ACETAMINOPHEN 500 MG
1000 TABLET ORAL EVERY 8 HOURS SCHEDULED
Status: DISCONTINUED | OUTPATIENT
Start: 2022-05-13 | End: 2022-05-14 | Stop reason: HOSPADM

## 2022-05-13 RX ADMIN — Medication 10 ML: at 00:16

## 2022-05-13 RX ADMIN — ACETAMINOPHEN 1000 MG: 500 TABLET ORAL at 21:39

## 2022-05-13 RX ADMIN — Medication 10 ML: at 08:35

## 2022-05-13 RX ADMIN — NORETHINDRONE 5 MG: 5 TABLET ORAL at 10:08

## 2022-05-13 RX ADMIN — SODIUM CHLORIDE, POTASSIUM CHLORIDE, SODIUM LACTATE AND CALCIUM CHLORIDE 9 ML/HR: 600; 310; 30; 20 INJECTION, SOLUTION INTRAVENOUS at 14:18

## 2022-05-13 RX ADMIN — PROPOFOL 150 MG: 10 INJECTION, EMULSION INTRAVENOUS at 15:03

## 2022-05-13 RX ADMIN — SODIUM CHLORIDE, POTASSIUM CHLORIDE, SODIUM LACTATE AND CALCIUM CHLORIDE 100 ML/HR: 600; 310; 30; 20 INJECTION, SOLUTION INTRAVENOUS at 21:40

## 2022-05-13 RX ADMIN — DEXAMETHASONE SODIUM PHOSPHATE 10 MG: 10 INJECTION INTRAMUSCULAR; INTRAVENOUS at 15:09

## 2022-05-13 RX ADMIN — FENTANYL CITRATE 25 MCG: 0.05 INJECTION, SOLUTION INTRAMUSCULAR; INTRAVENOUS at 15:12

## 2022-05-13 RX ADMIN — SERTRALINE HYDROCHLORIDE 50 MG: 50 TABLET, FILM COATED ORAL at 08:35

## 2022-05-13 RX ADMIN — FENTANYL CITRATE 50 MCG: 0.05 INJECTION, SOLUTION INTRAMUSCULAR; INTRAVENOUS at 17:43

## 2022-05-13 RX ADMIN — SODIUM CHLORIDE, POTASSIUM CHLORIDE, SODIUM LACTATE AND CALCIUM CHLORIDE 100 ML/HR: 600; 310; 30; 20 INJECTION, SOLUTION INTRAVENOUS at 00:12

## 2022-05-13 RX ADMIN — LIDOCAINE HYDROCHLORIDE 60 MG: 20 INJECTION, SOLUTION INFILTRATION; PERINEURAL at 15:03

## 2022-05-13 RX ADMIN — FENTANYL CITRATE 25 MCG: 0.05 INJECTION, SOLUTION INTRAMUSCULAR; INTRAVENOUS at 15:18

## 2022-05-13 RX ADMIN — FOLIC ACID 1 MG: 1 TABLET ORAL at 08:35

## 2022-05-13 RX ADMIN — FERROUS SULFATE TAB 325 MG (65 MG ELEMENTAL FE) 325 MG: 325 (65 FE) TAB at 08:35

## 2022-05-13 RX ADMIN — MIDAZOLAM HYDROCHLORIDE 1 MG: 2 INJECTION, SOLUTION INTRAMUSCULAR; INTRAVENOUS at 14:19

## 2022-05-13 RX ADMIN — FAMOTIDINE 20 MG: 10 INJECTION INTRAVENOUS at 14:19

## 2022-05-13 RX ADMIN — FENTANYL CITRATE 50 MCG: 0.05 INJECTION, SOLUTION INTRAMUSCULAR; INTRAVENOUS at 15:32

## 2022-05-13 RX ADMIN — MULTIPLE VITAMINS W/ MINERALS TAB 1 TABLET: TAB at 08:35

## 2022-05-13 RX ADMIN — Medication 1000 MCG: at 08:35

## 2022-05-13 NOTE — ED PROVIDER NOTES
EMERGENCY DEPARTMENT ENCOUNTER    Room Number:  18/18  Date seen:  5/12/2022  PCP: Suzanne Kimball APRN  Historian: Patient      HPI:  Chief Complaint: Vaginal bleeding  A complete HPI/ROS/PMH/PSH/SH/FH are unobtainable due to: Nothing  Context: Biju Harrington is a 51 y.o. female who presents to the ED c/o persistent vaginal bleeding.  Patient reports that her vaginal bleeding started in January.  It really got worse in mid April and she was also having some lower abdominal cramping and experiencing fatigue.  She presented to the hospital and was found to be anemic and was admitted for blood transfusions.  She was evaluated by OB/GYN at that time and they recommended aygestin 5mg daily as outpatient which provided only temporary improvement of her vaginal bleeding.  Unfortunately she developed a superficial thrombophlebitis in the right upper extremity from IV placement and then also a DVT in the right ulnar vein.  She was started on an Eliquis starter pack this past Tuesday.  She took 3 or 4 doses but her bleeding worsened yesterday.  She saw gynecology today in the office who recommended increasing her ayegestin to 10 mg a day.  She was told that they cannot do any kind of surgical intervention or ablation until she has been cleared from the DVT.  She denies chest pain or current shortness of breath.  She is still feeling fatigued.  She estimates she is soaked 12-15 pads today alone.  She has been to the restroom 6 times in the last 2 hours to the emergency department with bleeding.            PAST MEDICAL HISTORY  Active Ambulatory Problems     Diagnosis Date Noted   • Colon cancer screening 12/13/2021   • Acute abdominal pain 04/26/2022   • Microcytic anemia 04/26/2022   • DUB (dysfunctional uterine bleeding) 04/26/2022   • Iron deficiency anemia due to chronic blood loss 04/27/2022   • Folic acid deficiency anemia 04/27/2022   • Fibroid, uterine 04/27/2022     Resolved Ambulatory Problems     Diagnosis  Date Noted   • No Resolved Ambulatory Problems     Past Medical History:   Diagnosis Date   • Anxiety and depression    • Hot flashes    • Migraine    • PONV (postoperative nausea and vomiting)          PAST SURGICAL HISTORY  Past Surgical History:   Procedure Laterality Date   • CHOLECYSTECTOMY     • COLONOSCOPY N/A 2022    Procedure: COLONOSCOPY WITH BIOPSY;  Surgeon: Leigh Andrade MD;  Location: [facility] ENDOSCOPY;  Service: Gastroenterology;  Laterality: N/A;  COLON POLYP, HEMORRHOIDS   • OVARIAN CYST REMOVAL     • ROTATOR CUFF REPAIR Right    • TUBAL ABDOMINAL LIGATION           FAMILY HISTORY  Family History   Problem Relation Age of Onset   • Diabetes Father    • Diabetes Maternal Grandmother    • Cancer Paternal Grandfather         colon   • Malig Hyperthermia Neg Hx          SOCIAL HISTORY  Social History     Socioeconomic History   • Marital status:    Tobacco Use   • Smoking status: Former Smoker     Packs/day: 0.50     Years: 15.00     Pack years: 7.50     Start date:      Quit date: 10/2021     Years since quittin.6   • Smokeless tobacco: Never Used   Vaping Use   • Vaping Use: Never used   Substance and Sexual Activity   • Alcohol use: Yes     Comment: occasionally   • Drug use: Never   • Sexual activity: Yes         ALLERGIES  Phenergan [promethazine]        REVIEW OF SYSTEMS  Review of Systems   Review of all 14 systems is negative other than stated in the HPI above.      PHYSICAL EXAM  ED Triage Vitals   Temp Heart Rate Resp BP SpO2   22 1752 22 1752 22 1752 22 1823 05/12/22 1752   98.8 °F (37.1 °C) 84 16 135/69 100 %      Temp src Heart Rate Source Patient Position BP Location FiO2 (%)   22 1752 22 1752 22 1823 -- --   Tympanic Monitor Lying           GENERAL: Awake and alert, no acute distress  HENT: nares patent  EYES: no scleral icterus, EOMI  CV: regular rhythm, normal rate  RESPIRATORY: normal effort  ABDOMEN: soft,  nondistended  MUSCULOSKELETAL: no deformity, no swelling right upper extremity  NEURO: alert, moves all extremities, follows commands, speech fluent and clear  PSYCH:  calm, cooperative  SKIN: warm, dry, no rash    Vital signs and nursing notes reviewed.          LAB RESULTS  Recent Results (from the past 24 hour(s))   Comprehensive Metabolic Panel    Collection Time: 05/12/22  6:44 PM    Specimen: Blood   Result Value Ref Range    Glucose 138 (H) 65 - 99 mg/dL    BUN 10 6 - 20 mg/dL    Creatinine 0.71 0.57 - 1.00 mg/dL    Sodium 140 136 - 145 mmol/L    Potassium 4.0 3.5 - 5.2 mmol/L    Chloride 107 98 - 107 mmol/L    CO2 23.0 22.0 - 29.0 mmol/L    Calcium 8.8 8.6 - 10.5 mg/dL    Total Protein 6.5 6.0 - 8.5 g/dL    Albumin 4.20 3.50 - 5.20 g/dL    ALT (SGPT) 10 1 - 33 U/L    AST (SGOT) 14 1 - 32 U/L    Alkaline Phosphatase 34 (L) 39 - 117 U/L    Total Bilirubin 0.2 0.0 - 1.2 mg/dL    Globulin 2.3 gm/dL    A/G Ratio 1.8 g/dL    BUN/Creatinine Ratio 14.1 7.0 - 25.0    Anion Gap 10.0 5.0 - 15.0 mmol/L    eGFR 104.4 >60.0 mL/min/1.73   Type & Screen    Collection Time: 05/12/22  6:44 PM    Specimen: Blood   Result Value Ref Range    ABO Type A     RH type Positive     Antibody Screen Negative     T&S Expiration Date 5/15/2022 11:59:59 PM    hCG, Serum, Qualitative    Collection Time: 05/12/22  6:44 PM    Specimen: Blood   Result Value Ref Range    HCG Qualitative Negative Negative   Protime-INR    Collection Time: 05/12/22  6:44 PM    Specimen: Blood   Result Value Ref Range    Protime 14.4 (H) 11.7 - 14.2 Seconds    INR 1.12 (H) 0.90 - 1.10   aPTT    Collection Time: 05/12/22  6:44 PM    Specimen: Blood   Result Value Ref Range    PTT 29.8 22.7 - 35.4 seconds   CBC Auto Differential    Collection Time: 05/12/22  6:44 PM    Specimen: Blood   Result Value Ref Range    WBC 6.06 3.40 - 10.80 10*3/mm3    RBC 3.46 (L) 3.77 - 5.28 10*6/mm3    Hemoglobin 8.8 (L) 12.0 - 15.9 g/dL    Hematocrit 28.1 (L) 34.0 - 46.6 %    MCV 81.2  79.0 - 97.0 fL    MCH 25.4 (L) 26.6 - 33.0 pg    MCHC 31.3 (L) 31.5 - 35.7 g/dL    RDW 25.2 (H) 12.3 - 15.4 %    RDW-SD 71.1 (H) 37.0 - 54.0 fl    MPV 10.9 6.0 - 12.0 fL    Platelets 421 140 - 450 10*3/mm3    Neutrophil % 57.2 42.7 - 76.0 %    Lymphocyte % 33.7 19.6 - 45.3 %    Monocyte % 5.9 5.0 - 12.0 %    Eosinophil % 1.3 0.3 - 6.2 %    Basophil % 1.7 (H) 0.0 - 1.5 %    Neutrophils, Absolute 3.47 1.70 - 7.00 10*3/mm3    Lymphocytes, Absolute 2.04 0.70 - 3.10 10*3/mm3    Monocytes, Absolute 0.36 0.10 - 0.90 10*3/mm3    Eosinophils, Absolute 0.08 0.00 - 0.40 10*3/mm3    Basophils, Absolute 0.10 0.00 - 0.20 10*3/mm3   Prepare RBC, 2 Units    Collection Time: 05/12/22  8:28 PM   Result Value Ref Range    Product Code R4827R62     Unit Number [id number]     UNIT  ABO A     UNIT  RH POS     Crossmatch Interpretation Compatible     Dispense Status XM     Blood Expiration Date 202205302359     Blood Type Barcode 6200     Product Code E7911X85     Unit Number [id number]     UNIT  ABO A     UNIT  RH POS     Crossmatch Interpretation Compatible     Dispense Status XM     Blood Expiration Date 202205302359     Blood Type Barcode 6200        Ordered the above labs and reviewed the results.        RADIOLOGY  No Radiology Exams Resulted Within Past 24 Hours    Ordered the above noted radiological studies. Reviewed by me in PACS.            PROCEDURES  Procedures              MEDICATIONS GIVEN IN ER  Medications   sodium chloride 0.9 % flush 10 mL (has no administration in time range)                   MEDICAL DECISION MAKING, PROGRESS, and CONSULTS    All labs have been independently reviewed by me.  All radiology studies have been reviewed by me and discussed with radiologist dictating the report.   EKG's independently viewed and interpreted by me.  Discussion below represents my analysis of pertinent findings related to patient's condition, differential diagnosis, treatment plan and final  disposition.      Differential diagnosis includes but is not limited to:  Uterine fibroids  Endometrial cancer  Coagulopathy  DUB      ED Course as of 05/12/22 2033   u May 12, 2022   2025 Discussed with Dr. Lopez, OB/GYN on-call for Dr. Russell.  He recommended admitting patient to observation unit, type and crossing 2 units packed red cells.  Patient to remain n.p.o. at midnight for possible D&C or ablation tomorrow.  He will notify Dr. Russell. [JR]   2026 Discussed with GIANA Kruger in the ED observation unit who agrees to admit on behalf of Dr. Dhillon. [JR]      ED Course User Index  [JR] Rah Selby MD              I wore an N95 mask, face shield, and gloves during this patient encounter.  Patient also wearing a surgical mask.  Hand hygeine performed before and after seeing the patient.    DIAGNOSIS  Final diagnoses:   DUB (dysfunctional uterine bleeding)   Acute deep vein thrombosis (DVT) of ulnar vein of right upper extremity (HCC)   Anemia, unspecified type         DISPOSITION  ADMIT            Latest Documented Vital Signs:  As of 20:33 EDT  BP- 112/86 HR- 74 Temp- 98.8 °F (37.1 °C) (Tympanic) O2 sat- 99%        --    Please note that portions of this were completed with a voice recognition program.          Rah Selby MD  05/12/22 2033

## 2022-05-13 NOTE — PROGRESS NOTES
MD ATTESTATION NOTE    The YELITZA and I have discussed this patient's history, physical exam, and treatment plan.  I have reviewed the documentation and personally had a face to face interaction with the patient. I affirm the documentation and agree with the treatment and plan.  The attached note describes my personal findings.      I provided a substantive portion of the care of the patient.  I personally performed the physical exam in its entirety, and below are my findings.  For this patient encounter, the patient wore surgical mask, I wore full protective PPE including N95 and eye protection.      Brief HPI: Patient was admitted for further evaluation of vaginal bleeding.  She has continued to have some bleeding overnight.  Denies abdominal/pelvic pain,, shortness of breath, or chest pain.  She has a known DVT in her left arm and was taking Eliquis.  Eliquis is currently being held.    PHYSICAL EXAM  ED Triage Vitals   Temp Heart Rate Resp BP SpO2   05/12/22 1752 05/12/22 1752 05/12/22 1752 05/12/22 1823 05/12/22 1752   98.8 °F (37.1 °C) 84 16 135/69 100 %      Temp src Heart Rate Source Patient Position BP Location FiO2 (%)   05/12/22 1752 05/12/22 1752 05/12/22 1823 05/12/22 2134 --   Tympanic Monitor Lying Right arm          GENERAL: Awake, alert, oriented x3.  Well-developed, well-nourished female.  Resting comfortably in no acute distress  HENT: nares patent  EYES: Conjunctiva are pale  CV: regular rhythm, normal rate  RESPIRATORY: normal effort, clear to auscultation bilaterally  ABDOMEN: soft, nontender  MUSCULOSKELETAL: no deformity  NEURO: alert, moves all extremities, follows commands  PSYCH:  calm, cooperative  SKIN: warm, dry    Vital signs and nursing notes reviewed.        Plan: Patient remains hemodynamically stable.  Hemoglobin is also stable.  Patient has been seen by gynecology and their input is appreciated.  Awaiting their decision on possible hysteroscopy/D&C or ablation.

## 2022-05-13 NOTE — ANESTHESIA PROCEDURE NOTES
Airway  Urgency: elective    Date/Time: 5/13/2022 3:04 PM    General Information and Staff    Patient location during procedure: OR  Anesthesiologist: Kenney Crow MD  CRNA/CAA: Griselda Vieira CRNA    Indications and Patient Condition  Indications for airway management: airway protection    Preoxygenated: yes  Mask difficulty assessment: 1 - vent by mask    Final Airway Details  Final airway type: supraglottic airway      Successful airway: unique  Size 4    Number of attempts at approach: 1  Assessment: lips, teeth, and gum same as pre-op and atraumatic intubation

## 2022-05-13 NOTE — ED NOTES
.  Nursing report ED to floor  Biju Harrington  51 y.o.  female    HPI :   Chief Complaint   Patient presents with   • Vaginal Bleeding       Admitting doctor:   José Luis Dhillon MD    Admitting diagnosis:   The primary encounter diagnosis was DUB (dysfunctional uterine bleeding). Diagnoses of Acute deep vein thrombosis (DVT) of ulnar vein of right upper extremity (HCC) and Anemia, unspecified type were also pertinent to this visit.    Code status:   Current Code Status     Date Active Code Status Order ID Comments User Context       5/12/2022 2039 CPR (Attempt to Resuscitate) [id number]  Kasandra Cox PA ED     Advance Care Planning Activity      Questions for Current Code Status     Question Answer    Code Status (Patient has no pulse and is not breathing) CPR (Attempt to Resuscitate)    Medical Interventions (Patient has pulse or is breathing) Full Support    Level Of Support Discussed With Patient          Allergies:   Phenergan [promethazine]    Intake and Output  No intake or output data in the 24 hours ending 05/12/22 2051    Weight:   There were no vitals filed for this visit.    Most recent vitals:   Vitals:    05/12/22 1824 05/12/22 1825 05/12/22 2000 05/12/22 2032   BP: 121/86 136/83  112/86   Patient Position: Standing Standing     Pulse: 89 85 74    Resp:    16   Temp:       TempSrc:       SpO2:   99%        Active LDAs/IV Access:   Lines, Drains & Airways     Active LDAs     Name Placement date Placement time Site Days    Peripheral IV 05/12/22 1845 Left Antecubital 05/12/22 1845  Antecubital  less than 1                Labs (abnormal labs have a star):   Labs Reviewed   COMPREHENSIVE METABOLIC PANEL - Abnormal; Notable for the following components:       Result Value    Glucose 138 (*)     Alkaline Phosphatase 34 (*)     All other components within normal limits    Narrative:     GFR Normal >60  Chronic Kidney Disease <60  Kidney Failure <15     PROTIME-INR - Abnormal; Notable for the following  components:    Protime 14.4 (*)     INR 1.12 (*)     All other components within normal limits   CBC WITH AUTO DIFFERENTIAL - Abnormal; Notable for the following components:    RBC 3.46 (*)     Hemoglobin 8.8 (*)     Hematocrit 28.1 (*)     MCH 25.4 (*)     MCHC 31.3 (*)     RDW 25.2 (*)     RDW-SD 71.1 (*)     Basophil % 1.7 (*)     All other components within normal limits   HCG, SERUM, QUALITATIVE - Normal   APTT - Normal   COVID PRE-OP / PRE-PROCEDURE SCREENING ORDER (NO ISOLATION)    Narrative:     The following orders were created for panel order COVID PRE-OP / PRE-PROCEDURE SCREENING ORDER (NO ISOLATION) - Swab, Nasopharynx.  Procedure                               Abnormality         Status                     ---------                               -----------         ------                     COVID-19, [facility] IN-HOUSE...[[id number]]                      In process                   Please view results for these tests on the individual orders.   COVID-19, [facility] IN-HOUSE CEPHEID/CURLY, NP SWAB IN TRANSPORT MEDIA 8-12 HR TAT   TYPE AND SCREEN   PREPARE RBC   CBC AND DIFFERENTIAL    Narrative:     The following orders were created for panel order CBC & Differential.  Procedure                               Abnormality         Status                     ---------                               -----------         ------                     CBC Auto Differential[[id number]]        Abnormal            Final result                 Please view results for these tests on the individual orders.       EKG:   No orders to display       Meds given in ED:   Medications   sodium chloride 0.9 % flush 10 mL (has no administration in time range)       Imaging results:  No radiology results for the last day    Ambulatory status:   - up ad jenna    Social issues:   Social History     Socioeconomic History   • Marital status:    Tobacco Use   • Smoking status: Former Smoker     Packs/day: 0.50     Years: 15.00     Pack years:  7.50     Start date:      Quit date: 10/2021     Years since quittin.6   • Smokeless tobacco: Never Used   Vaping Use   • Vaping Use: Never used   Substance and Sexual Activity   • Alcohol use: Yes     Comment: occasionally   • Drug use: Never   • Sexual activity: Yes       NIH Stroke Scale:        Nursing report ED to floor:

## 2022-05-13 NOTE — CONSULTS
[facility] INITIAL INPATIENT CONSULTATION NOTE    REASON FOR CONSULTATION:    Menorrhagia on Eliquis for right upper extremity DVT    HISTORY OF PRESENT ILLNESS:  Biju Harrington is a 51 y.o. female who we are asked to see today in consultation for worsening menorrhagia on Eliquis.  Right upper extremity DVT.    She has a history of menorrhagia that has worsened since the first of the year.        Recent admission on 4/26/2022 with menorrhagia, iron deficiency anemia.  She received 2 doses of Ferrlecit 250 mg on 4/26/2022 and 4/27/2022.  She required 1 unit of packed red blood cells on 4/26/2022.    Right upper extremity venous duplex on 5/6/2022 with acute right upper extremity DVT in the ulnar and acute right upper extremity superficial thrombophlebitis in the basilic and median cubital veins.  This was presumably provoked by a right antecubital peripheral IV.    She was started on Eliquis.  She took this for a day and a half.  She developed worsening menorrhagia.    She required 1 unit of packed red blood cells for anemia.    She has been seen by gynecology with hysteroscopy, D&C, possible myomectomy with MyoSure, possible endometrial ablation with NovaSure suggested and performed on 5/13    Significantly less bleeding following the procedure now.  She resumed Eliquis overnight which she has tolerated so far.    No other history of bleeding.  No other history of clotting.  No family history of bleeding or clotting.    Past Medical History:   Diagnosis Date   • Anxiety and depression    • Hot flashes    • Migraine    • PONV (postoperative nausea and vomiting)        Past Surgical History:   Procedure Laterality Date   • CHOLECYSTECTOMY     • COLONOSCOPY N/A 2/25/2022    Procedure: COLONOSCOPY WITH BIOPSY;  Surgeon: Leigh Andrade MD;  Location: [facility] ENDOSCOPY;  Service: Gastroenterology;  Laterality: N/A;  COLON POLYP, HEMORRHOIDS   • OVARIAN CYST REMOVAL     • ROTATOR CUFF REPAIR Right     • TUBAL ABDOMINAL LIGATION         SOCIAL HISTORY:   reports that she quit smoking about 7 months ago. She started smoking about 33 years ago. She has a 7.50 pack-year smoking history. She has never used smokeless tobacco. She reports current alcohol use. She reports that she does not use drugs.    FAMILY HISTORY:  family history includes Cancer in her paternal grandfather; Diabetes in her father and maternal grandmother.    ALLERGIES:  Allergies   Allergen Reactions   • Phenergan [Promethazine] GI Intolerance       MEDICATIONS:  As listed in the electronic medical record.    Review of Systems   Constitutional: Positive for fatigue.   Genitourinary: Positive for menstrual problem.   All other systems reviewed and are negative.      Vitals:    05/13/22 2300 05/14/22 0146 05/14/22 0506 05/14/22 0929   BP: 120/76 115/70 124/72 131/70   BP Location: Left arm Left arm Left arm Left arm   Patient Position: Lying Lying Lying Lying   Pulse: 82 75 88 82   Resp: 16 16 16 18   Temp:  98.7 °F (37.1 °C) 96.9 °F (36.1 °C) 98.6 °F (37 °C)   TempSrc:  Oral Oral Oral   SpO2: 99% 100% 98% 98%   Weight:       Height:           Physical Exam  Vitals reviewed.   Constitutional:       Appearance: She is well-developed.   HENT:      Head: Normocephalic and atraumatic.      Nose: Nose normal.   Eyes:      Conjunctiva/sclera: Conjunctivae normal.      Pupils: Pupils are equal, round, and reactive to light.   Cardiovascular:      Rate and Rhythm: Normal rate and regular rhythm.      Heart sounds: Normal heart sounds, S1 normal and S2 normal. No murmur heard.    No friction rub. No gallop.   Pulmonary:      Effort: Pulmonary effort is normal. No respiratory distress.      Breath sounds: Normal breath sounds. No stridor. No wheezing, rhonchi or rales.   Chest:      Chest wall: No tenderness.   Breasts:      Right: No supraclavicular adenopathy.      Left: No supraclavicular adenopathy.       Abdominal:      General: Bowel sounds are normal.  There is no distension.      Palpations: Abdomen is soft. There is no mass.      Tenderness: There is no abdominal tenderness. There is no guarding or rebound.   Musculoskeletal:         General: Normal range of motion.      Cervical back: Neck supple.   Lymphadenopathy:      Cervical: No cervical adenopathy.      Upper Body:      Right upper body: No supraclavicular adenopathy.      Left upper body: No supraclavicular adenopathy.   Skin:     General: Skin is warm and dry.      Findings: No erythema or rash.      Comments: Mild edema and tenderness in the right antecubital fossa   Neurological:      Mental Status: She is alert and oriented to person, place, and time.      Cranial Nerves: No cranial nerve deficit.      Sensory: No sensory deficit.   Psychiatric:         Behavior: Behavior normal.         Thought Content: Thought content normal.         Judgment: Judgment normal.         DIAGNOSTIC DATA:  Results from last 7 days   Lab Units 05/14/22  0459   WBC 10*3/mm3 8.52   HEMOGLOBIN g/dL 7.1*   HEMATOCRIT % 23.8*   PLATELETS 10*3/mm3 344     Lab Results   Component Value Date    NEUTROABS 3.41 05/13/2022     Results from last 7 days   Lab Units 05/14/22  0459   SODIUM mmol/L 137   POTASSIUM mmol/L 3.8   CHLORIDE mmol/L 108*   CO2 mmol/L 20.0*   BUN mg/dL 9   CREATININE mg/dL 0.63   GLUCOSE mg/dL 174*   CALCIUM mg/dL 7.7*     Results from last 7 days   Lab Units 05/12/22  1844   INR  1.12*   APTT seconds 29.8           IMAGING: None reviewed    ASSESSMENT:  This is a 51 y.o. female with:    *Menorrhagia  · She has been seen by gynecology with hysteroscopy, D&C, possible myomectomy with MyoSure, possible endometrial ablation with NovaSure suggested and performed on 5/13  · No other history of bleeding issues.  Low suspicion for a bleeding disorder.    *Acute right upper extremity DVT  · Right upper extremity venous duplex on 5/6/2022 with acute right upper extremity DVT in the ulnar and acute right upper extremity  superficial thrombophlebitis in the basilic and median cubital veins.  · Provoked by right antecubital peripheral IV  · She was started on Eliquis with worsening bleeding  · Low suspicion for clotting disorder.    *Acute blood loss anemia  · Recent admission on 4/26/2022 with menorrhagia, iron deficiency anemia.  She received 2 doses of Ferrlecit 250 mg on 4/26/2022 and 4/27/2022.  She required 1 unit of packed red blood cells on 4/26/2022.  • Admission hemoglobin was 7.1.  She received 1 unit of packed red blood cells with improvement.  • Hgb currently 7.1      RECOMMENDATIONS/PLAN:   1. Okay for discharge from my standpoint on Eliquis with a loading dose of 10 mg twice daily for 7 days followed by 5 mg twice daily.  If she has worsening bleeding at this point I would suggest continuing 5 mg twice daily if she can tolerate this.  2. She was scheduled to see Dr. Montelongo in the office on Monday as a new patient.  We will cancel this.  I will see her back after her right upper extremities duplex in a few weeks with labs.  3. I advised her to notify us if she has any issues prior to that.    Adiel Odom MD

## 2022-05-13 NOTE — PROGRESS NOTES
MD Attestation Note    I supervised care provided by the midlevel provider.    The YELITZA and I have discussed this patient's history, physical exam, and treatment plan. I have reviewed the documentation and personally had a face to face interaction with the patient  I affirm the documentation and agree with the treatment and plan. I provided a substantive portion of the care of this patient.  I personally performed the physical exam, in its entirety.  My personal findings are documented in below:    51-year-old female with history of fibroids, dysfunctional uterine bleeding, anemia requiring transfusion presents with vaginal bleeding.  Patient was admitted to the hospital in April for vaginal bleeding, received blood transfusions, evaluated by OB/GYN and started on Aygestin.  Patient subsequently developed a superficial thrombophlebitis as well as DVT and was started on Eliquis.  Patient has only been on Eliquis since Tuesday but bleeding has increased, was seen by gynecology yesterday and Aygestin was increased.  Patient was having extreme vaginal bleeding with report 12-15 pads today.  Patient denies any chest pain, no shortness of breath, no lightheadedness or dizziness.    On exam:  General: NAD.  Head: NCAT.  ENT: EOMI, PERRLA, MMM.  Neck: Supple, trachea midline.  Cardiac: regular rate and rhythm.  Lungs: CTAB.  Abdomen: Soft, NTTP.   Extremities: Moves all extremities well  Skin: Warm, dry.    Plan: 51-year-old female with history of fibroids dysfunctional uterine bleeding and anemia requiring transfusion presents with vaginal bleeding.  Hemoglobin 8.8, vital signs stable.  OB/GYN consulted, recommended admission to the observation center, monitoring, typed and crossed 2 units, holding anticoagulation, obtain repeat lab work in a.m., plan for procedure tomorrow with OB/GYN.

## 2022-05-13 NOTE — ANESTHESIA PREPROCEDURE EVALUATION
Anesthesia Evaluation     Patient summary reviewed   history of anesthetic complications (x1, with shoulder): PONV  NPO Solid Status: > 8 hours  NPO Liquid Status: > 2 hours           Airway   Mallampati: II  TM distance: >3 FB  Neck ROM: full  No difficulty expected  Dental      Pulmonary     breath sounds clear to auscultation  (-) shortness of breath, recent URI, not a smoker  Cardiovascular   Exercise tolerance: good (4-7 METS)    PT is on anticoagulation therapy  Rhythm: regular  Rate: normal    (+) DVT (RUE, ulnar and brachial vein) current,   (-) past MI, dysrhythmias, angina      Neuro/Psych  (+) headaches (migraine hx), psychiatric history Anxiety,    (-) seizures, CVA  GI/Hepatic/Renal/Endo    (-)  obesity, no renal disease, diabetes    Musculoskeletal     (-) neck stiffness  Abdominal    Substance History      OB/GYN      Comment: DUB      Other   blood dyscrasia (Hgb 7.8; multiple transfusions in past wks) anemia,     ROS/Med Hx Other: Last apixaban dose wed am                  Anesthesia Plan    ASA 2 - emergent     general     intravenous induction     Anesthetic plan, all risks, benefits, and alternatives have been provided, discussed and informed consent has been obtained with: patient.    Plan discussed with CRNA.        CODE STATUS:

## 2022-05-13 NOTE — PROGRESS NOTES
[facility]     Progress Note    Patient Name: Biju Harrington  : 1970  MRN: [id number]  Primary Care Physician:  Suzanne Kimball APRN  Date of admission: 2022    Subjective   Subjective     Chief Complaint:    Vaginal Bleeding  The patient's primary symptoms include vaginal bleeding. This is a recurrent problem. The current episode started in the past 7 days. The problem occurs constantly. The problem has been unchanged. The patient is experiencing no pain. Pertinent negatives include no abdominal pain, back pain, constipation or fever. She has not been passing clots. She has not been passing tissue. Nothing aggravates the symptoms. The treatment provided no relief.         Review of Systems   Constitutional: Negative for fever.   Gastrointestinal: Negative for abdominal pain and constipation.   Genitourinary: Positive for vaginal bleeding.   Musculoskeletal: Negative for back pain.       Objective   Objective     Vitals:   Temp:  [98 °F (36.7 °C)-98.8 °F (37.1 °C)] 98.1 °F (36.7 °C)  Heart Rate:  [60-89] 74  Resp:  [16] 16  BP: (106-146)/(63-86) 128/72    Physical Exam  Vitals and nursing note reviewed.   Constitutional:       General: She is not in acute distress.     Appearance: Normal appearance. She is normal weight.   HENT:      Head: Normocephalic.      Mouth/Throat:      Mouth: Mucous membranes are moist.   Eyes:      Pupils: Pupils are equal, round, and reactive to light.   Cardiovascular:      Rate and Rhythm: Normal rate and regular rhythm.      Pulses: Normal pulses.      Heart sounds: Normal heart sounds.   Pulmonary:      Effort: Pulmonary effort is normal.      Breath sounds: Normal breath sounds.   Abdominal:      General: Abdomen is flat. Bowel sounds are normal.      Palpations: Abdomen is soft.   Musculoskeletal:         General: Normal range of motion.      Cervical back: Normal range of motion.   Skin:     General: Skin is warm and dry.      Capillary Refill: Capillary  refill takes less than 2 seconds.   Neurological:      General: No focal deficit present.      Mental Status: She is alert and oriented to person, place, and time.   Psychiatric:         Mood and Affect: Mood normal.         Behavior: Behavior normal.          Result Review    Result Review:  I have personally reviewed the results from the time of this admission to 5/13/2022 08:13 EDT and agree with these findings:  [x]  Laboratory  [x]  Microbiology  [x]  Radiology  []  EKG/Telemetry   []  Cardiology/Vascular   []  Pathology  []  Old records  []  Other:  Most notable findings include: left sided facial numbness    Assessment & Plan   Assessment / Plan     Brief Patient Summary:  Biju Harrington is a 51 y.o. female who Presents with complaint of blurry vision yesterday.  Upon assessment this am, blurred vision was resolved.  Active Hospital Problems:  Active Hospital Problems    Diagnosis     Uterine bleeding, dysfunctional      Plan:      1.  Abnormal uterine bleeding with anemia  -Hemoglobin 8.8, every 6 hemoglobin and hematocrit  -Consult OB/GYN  -Hold Eliquis  -Continue Aygestin  -N.p.o. after midnight, IVF  -Telemetry monitoring  -Monitor     2.  Right ulnar vein DVT  -Hold Eliquis  -Patient has outpatient appointment with hematology on Monday     3.  Anxiety and depression  -Continue medication as prescribed    DVT prophylaxis:  Mechanical DVT prophylaxis orders are present.    CODE STATUS:    Level Of Support Discussed With: Patient  Code Status (Patient has no pulse and is not breathing): CPR (Attempt to Resuscitate)  Medical Interventions (Patient has pulse or is breathing): Full Support    Disposition:  I expect patient to be discharged tomorrow    Patient had episode of left cheek numbness and left sided headache.  Spoke with LINDA FARIA and pateint will be admitted to Dr Caruso.    GIANA Lorenzo

## 2022-05-13 NOTE — PLAN OF CARE
Goal Outcome Evaluation:           Progress: no change  Outcome Evaluation: Very pleasant patient with spouse at bedside.  Sent patient to surgery for D&C. Vaginal bleeding remains but patient is asymptomatic.  She is up with stand-by assist but reports no dizziness. Her VSS no SOA.  consent signed. acute DVT in right arm (started on eliquis earlier this week) and hematology has been consulted for their recs on AC moving forward.

## 2022-05-13 NOTE — PROGRESS NOTES
GYN Progress Note       Patient name: Biju Harrington  Age: 51 y.o.  Sex: female  YOB: 1970   MRN: [id number]  Admission Date: 2022      Chief Complaint   Patient presents with   • Vaginal Bleeding      Subjective:    Biju Harrington is a 51 y.o.  female who was admitted yesterday to observation unit for management of anemia secondary to abnormal uterine bleeding (fibroid and possible adenomyosis). She received 1 U PRBCs overnight for management of anemia that had decreased to Hgb 7.1 and was 8.1 on repeat following blood transfusion. The patient is still having ongoing vaginal bleeding despite aygestin that she was increased to 10 mg yesterday. She also has an acute DVT of the right ulnar vein that she has been started on Eliquis on 22 that has been held since yesterday in case of surgery today. The patient is desiring surgical management of her ongoing vaginal bleeding. Denies light headedness or dizziness at this time but states significant bleeding this morning. Denies abdominal or pelvic pain.     Objective:   Temp:  [98 °F (36.7 °C)-98.8 °F (37.1 °C)] 98.2 °F (36.8 °C)  Heart Rate:  [60-90] 78  Resp:  [16-18] 18  BP: (106-146)/(63-86) 137/82    Exam:     Physical Examination: General appearance - alert, well appearing, and in no distress  Mental status - alert, oriented to person, place, and time  Eyes - sclera anicteric, left eye normal, right eye normal  Ears - right ear normal, left ear normal  Neck - normal ROM  Chest - no increased work of breathing, regular respirations   Heart - normal rate and regular rhythm  Abdomen - soft, nontender, non-distended.   Pelvic - Pad with thin line of dark red blood, patient recently changed pads  Neurological - alert, oriented, normal speech, no focal findings or movement disorder noted  Musculoskeletal - no joint tenderness, deformity or swelling  Extremities - no pedal edema noted  Skin - normal coloration and turgor, no rashes, no  suspicious skin lesions noted    Labs:  Lab Results   Component Value Date    WBC 6.76 05/13/2022    HGB 8.1 (L) 05/13/2022    HCT 25.5 (L) 05/13/2022    MCV 87.3 05/13/2022     05/13/2022       Assessment:      1. Abnormal uterine bleeding- secondary to uterine fibroid, possible adenomyosis and recent anticoagulation  2. Acute anemia secondary to AUB- requiring blood transfusion of 1 U PRBCs overnight  3. History of ulnar vein DVT and superficial thrombophlebitis of right upper extremity related to previous IV site     Plan:      - Reviewed labs this morning that show Hgb stable at 8.1 following transfusion. Her Eliquis was held yesterday and this morning in anticipation of possible procedure to help management AUB. I discussed that I do feel she would benefit from a hysteroscopy, dilation and curettage, possible myomectomy with Myosure, and possible endometrial ablation with Novasure. Patient understands that the myomectomy and endometrial ablation may not be possible depending on findings during hysteroscopy but would like to proceed. Reviewed risks of procedure including infection, bleeding, damage to surrounding structures that could require additional surgery, pulmonary edema, electrolyte abnormalities, and increased risk of DVT/PE due to surgery. Patient understands the risk and would like to proceed. Will have patient sign consents. All questions answered. Will contact OR control desk for scheduling today. Patient to remain NPO. No antibiotics indicated. DVT PPx: SCDs during case.   - Recommend hematology consultation regarding ongoing treatment of DVT following procedure and should she be restarted on Eliquis vs other form of anticoagulation after procedure. Also aid in determining if continuing on long-term progesterone therapy until ultimately a hysterectomy could be performed after treatment of acute DVT. Appreciate recommendations.       Jia Russell MD  5/13/2022  12:29 EDT

## 2022-05-13 NOTE — CASE MANAGEMENT/SOCIAL WORK
Discharge Planning Assessment  [facility]     Patient Name: Biju Harrington  MRN: [id number]  Today's Date: 5/12/2022    Admit Date: 5/12/2022     Discharge Needs Assessment     Row Name 05/12/22 2059       Living Environment    People in Home spouse    Name(s) of People in Home Darinel Harrington ()    Current Living Arrangements home    Primary Care Provided by self    Provides Primary Care For no one    Family Caregiver if Needed spouse    Family Caregiver Names Darinel Harrington ()    Quality of Family Relationships supportive    Able to Return to Prior Arrangements yes       Resource/Environmental Concerns    Resource/Environmental Concerns none    Transportation Concerns none       Transition Planning    Patient/Family Anticipates Transition to home with family    Patient/Family Anticipated Services at Transition none    Transportation Anticipated family or friend will provide       Discharge Needs Assessment    Readmission Within the Last 30 Days previous discharge plan unsuccessful    Equipment Currently Used at Home none    Concerns to be Addressed no discharge needs identified;denies needs/concerns at this time    Anticipated Changes Related to Illness none    Equipment Needed After Discharge none    Patient's Choice of Community Agency(s) Denies any needs at discharge               Discharge Plan     Row Name 05/12/22 2101       Plan    Plan Discharge home with family    Patient/Family in Agreement with Plan yes    Plan Comments I entered the patients room in proper PPE, introduced myself and my role.  The patient was accompanied by her  Darinel and permission was given to speak with him in the room.  The patient states that she currently uses [facility] at [phone].  She denies ever using Home Health or Rehab.  Her  Darinel will transport her home when she is discharged.  The patient denies any needs at this time.  I did encourage them to request Case Management  should her needs change.  Patient verbalized understanding and had no further questions/concerns at this time.              Continued Care and Services - Admitted Since 5/12/2022    Coordination has not been started for this encounter.          Demographic Summary    No documentation.                Functional Status    No documentation.                Psychosocial    No documentation.                Abuse/Neglect    No documentation.                Legal    No documentation.                Substance Abuse    No documentation.                Patient Forms    No documentation.                   Mily Moncada RN

## 2022-05-13 NOTE — PLAN OF CARE
Goal Outcome Evaluation:  PT admitted with vaginal bleeding.  She had numerous episodes throughout the night and required 1 unit of PRBC.  No ABD pain or discomfort noted.  Vital signs have remained stable and PT has been NPO in preparation for potential surgery today.

## 2022-05-13 NOTE — H&P
H&P Note    Patient Identification:  Name: Biju Harrington  Age: 51 y.o.  Sex: female  :  1970  MRN: [id number]                       Chief Complaint: Heavy vaginal bleeding    History of Present Illness:   Patient is a 51-year-old  white female who uses tubal ligation as birth control and was seen in the emergency room tonight with profuse vaginal bleeding.  She states she has been soaking 12-15 pads during the day today and did have some heavy bleeding even after coming to the emergency room.  Patient's bleeding issues really began early April and she was admitted 2022 with a hemoglobin of 6.9.  She was transfused during that hospitalization and had a CT scan which showed endometrial thickening and possible adenomyosis.  She was discharged home on 5 mg Aygestin daily but was noted on 2022 on venous Dopplers to have a superficial venous thrombosis of the right arm along with a DVT of her right ulnar vein.  She was started on Eliquis but did not take it yesterday due to increased bleeding..  She also had a pelvic ultrasound which showed what looks like a uterine fibroid possibly impinging on the endometrial cavity.  Patient did come to the office to see her GYN today and her Aygestin was increased to 10 mg a day.  She had an appointment on Monday with hematology.    Problem List:  [unfilled]  Past Medical History:  Past Medical History:   Diagnosis Date   • Anxiety and depression    • Hot flashes    • Migraine    • PONV (postoperative nausea and vomiting)      Past Surgical History:  Past Surgical History:   Procedure Laterality Date   • CHOLECYSTECTOMY     • COLONOSCOPY N/A 2022    Procedure: COLONOSCOPY WITH BIOPSY;  Surgeon: Leigh Andrade MD;  Location: [facility] ENDOSCOPY;  Service: Gastroenterology;  Laterality: N/A;  COLON POLYP, HEMORRHOIDS   • OVARIAN CYST REMOVAL     • ROTATOR CUFF REPAIR Right    • TUBAL ABDOMINAL LIGATION        Home Meds:  (Not in a hospital  admission)    Current Meds:   [unfilled]  Allergies:  Allergies   Allergen Reactions   • Phenergan [Promethazine] GI Intolerance     Immunizations:  Immunization History   Administered Date(s) Administered   • Tdap 2020     Social History:   Social History     Tobacco Use   • Smoking status: Former Smoker     Packs/day: 0.50     Years: 15.00     Pack years: 7.50     Start date:      Quit date: 10/2021     Years since quittin.6   • Smokeless tobacco: Never Used   Substance Use Topics   • Alcohol use: Yes     Comment: occasionally      Family History:  Family History   Problem Relation Age of Onset   • Diabetes Father    • Diabetes Maternal Grandmother    • Cancer Paternal Grandfather         colon   • Malig Hyperthermia Neg Hx         Review of Systems  Pertinent items are noted in HPI.    Objective:  tMax 24 hrs: Temp (24hrs), Av.8 °F (37.1 °C), Min:98.8 °F (37.1 °C), Max:98.8 °F (37.1 °C)    Vitals Ranges:   Temp:  [98.8 °F (37.1 °C)] 98.8 °F (37.1 °C)  Heart Rate:  [74-89] 74  Resp:  [16] 16  BP: (112-136)/(69-86) 112/86  Intake and Output Last 3 Shifts:   No intake/output data recorded.    Exam:     General Appearance:    Alert, cooperative, no distress, appears stated age   Head:    Normocephalic, without obvious abnormality, atraumatic       Lungs:     Clear         Heart:    Regular rate and rhythm,        Abdomen:     Soft, non-tender, ,     no masses, no organomegaly   Genitalia:   Scant vaginal bleeding currently although patient has recently changed her pad.  No active bleeding noted.       Extremities:   Extremities normal, atraumatic, no cyanosis or edema   Skin:   Skin color, texture, turgor normal, no rashes or lesions           Data Review:  Hemoglobin tonight 8.8. [Previously 9.4   on 2022.]   Lab Results (last 24 hours)     Procedure Component Value Units Date/Time    COVID PRE-OP / PRE-PROCEDURE SCREENING ORDER (NO ISOLATION) - Swab, Nasopharynx [[id number]] Collected: 22  2034    Specimen: Swab from Nasopharynx Updated: 05/12/22 2047    Narrative:      The following orders were created for panel order COVID PRE-OP / PRE-PROCEDURE SCREENING ORDER (NO ISOLATION) - Swab, Nasopharynx.  Procedure                               Abnormality         Status                     ---------                               -----------         ------                     COVID-19,[facility] IN-HOUSE...[[id number]]                      In process                   Please view results for these tests on the individual orders.    COVID-19,[facility] IN-HOUSE CEPHEID/CURLY NP SWAB IN TRANSPORT MEDIA 8-12 HR TAT - Swab, Nasopharynx [[id number]] Collected: 05/12/22 2034    Specimen: Swab from Nasopharynx Updated: 05/12/22 2047    aPTT [[id number]]  (Normal) Collected: 05/12/22 1844    Specimen: Blood Updated: 05/12/22 1942     PTT 29.8 seconds     Protime-INR [[id number]]  (Abnormal) Collected: 05/12/22 1844    Specimen: Blood Updated: 05/12/22 1942     Protime 14.4 Seconds      INR 1.12    hCG, Serum, Qualitative [[id number]]  (Normal) Collected: 05/12/22 1844    Specimen: Blood Updated: 05/12/22 1926     HCG Qualitative Negative    Comprehensive Metabolic Panel [[id number]]  (Abnormal) Collected: 05/12/22 1844    Specimen: Blood Updated: 05/12/22 1915     Glucose 138 mg/dL      BUN 10 mg/dL      Creatinine 0.71 mg/dL      Sodium 140 mmol/L      Potassium 4.0 mmol/L      Chloride 107 mmol/L      CO2 23.0 mmol/L      Calcium 8.8 mg/dL      Total Protein 6.5 g/dL      Albumin 4.20 g/dL      ALT (SGPT) 10 U/L      AST (SGOT) 14 U/L      Alkaline Phosphatase 34 U/L      Total Bilirubin 0.2 mg/dL      Globulin 2.3 gm/dL      A/G Ratio 1.8 g/dL      BUN/Creatinine Ratio 14.1     Anion Gap 10.0 mmol/L      eGFR 104.4 mL/min/1.73      Comment: National Kidney Foundation and American Society of Nephrology (ASN) Task Force recommended calculation based on the Chronic Kidney Disease Epidemiology Collaboration (CKD-EPI) equation  refit without adjustment for race.       Narrative:      GFR Normal >60  Chronic Kidney Disease <60  Kidney Failure <15      CBC & Differential [[id number]]  (Abnormal) Collected: 05/12/22 1844    Specimen: Blood Updated: 05/12/22 1858    Narrative:      The following orders were created for panel order CBC & Differential.  Procedure                               Abnormality         Status                     ---------                               -----------         ------                     CBC Auto Differential[[id number]]        Abnormal            Final result                 Please view results for these tests on the individual orders.    CBC Auto Differential [[id number]]  (Abnormal) Collected: 05/12/22 1844    Specimen: Blood Updated: 05/12/22 1858     WBC 6.06 10*3/mm3      RBC 3.46 10*6/mm3      Hemoglobin 8.8 g/dL      Hematocrit 28.1 %      MCV 81.2 fL      MCH 25.4 pg      MCHC 31.3 g/dL      RDW 25.2 %      RDW-SD 71.1 fl      MPV 10.9 fL      Platelets 421 10*3/mm3      Neutrophil % 57.2 %      Lymphocyte % 33.7 %      Monocyte % 5.9 %      Eosinophil % 1.3 %      Basophil % 1.7 %      Neutrophils, Absolute 3.47 10*3/mm3      Lymphocytes, Absolute 2.04 10*3/mm3      Monocytes, Absolute 0.36 10*3/mm3      Eosinophils, Absolute 0.08 10*3/mm3      Basophils, Absolute 0.10 10*3/mm3         Assessment:    Uterine bleeding, dysfunctional      1.  Recurrent heavy vaginal bleeding with anemia probably secondary to uterine fibroids or adenomyosis  2.  History ulnar vein DVT and superficial thrombophlebitis of the upper extremity related to previous IV site  Plan:  1.  Since patient does not seem to be bleeding extremely heavily at this time, will bring in for observation and recheck CBC in the a.m.  We will hold Eliquis this evening as patient may benefit from a hysteroscopy with D&C, possible MyoSure procedure of endometrial polyp, possible NovaSure endometrial ablation.  Patient and her  very much  understand that an ablation may not be possible if the uterine fibroid is distorting the cavity.  If able to at least stabilize her bleeding so that we could increase her hemoglobin and complete treatment for her DVT, then if needed she could have a subsequent hysterectomy in a much more controlled and safe manner.  2.  Will obtain hematology consultation while in the hospital to aid with treatment of her DVT, and also to help us in determining what hormonal therapies which could be necessary would be the safest.  Kenney Lopez MD  5/12/2022

## 2022-05-13 NOTE — H&P
[facility]   HISTORY AND PHYSICAL    Patient Name: Biju Harrington  : 1970  MRN: [id number]  Primary Care Physician:  Suzanne Kimball APRN  Date of admission: 2022    Subjective   Subjective     Chief Complaint:   Chief Complaint   Patient presents with   • Vaginal Bleeding         HPI:    Biju Harrington is a 51 y.o. female with history of anxiety and depression, fibroids, tubal ligation, abnormal uterine bleeding requiring transfusion, and DVT on Eliquis, who presented to the emergency department complaining of recurrence/worsening of vaginal bleeding.  Patient was admitted to the hospital in April for vaginal bleeding, she was evaluated by OB/GYN and started on Aygestin.  She did require blood transfusion during this admission.  She subsequently developed a superficial from both phlebitis as well as DVT and was started on Eliquis 6 days ago.  Patient has had increased vaginal bleeding and reports soaking through 12-15 pads today.  She saw her OB/GYN this morning who recommended increasing Aygestin to 10 mg/day.    ED provider spoke with OB/GYN on-call who recommended observation unit admission as well as preparing 2 units of packed red blood cell for transfusion if needed.    Review of Systems   All systems were reviewed and negative except for: What is discussed in the HPI    Personal History     Past Medical History:   Diagnosis Date   • Anxiety and depression    • Hot flashes    • Migraine    • PONV (postoperative nausea and vomiting)        Past Surgical History:   Procedure Laterality Date   • CHOLECYSTECTOMY     • COLONOSCOPY N/A 2022    Procedure: COLONOSCOPY WITH BIOPSY;  Surgeon: Leigh Andrade MD;  Location: [facility] ENDOSCOPY;  Service: Gastroenterology;  Laterality: N/A;  COLON POLYP, HEMORRHOIDS   • OVARIAN CYST REMOVAL     • ROTATOR CUFF REPAIR Right    • TUBAL ABDOMINAL LIGATION         Family History: family history includes Cancer in her paternal grandfather;  Diabetes in her father and maternal grandmother. Otherwise pertinent FHx was reviewed and not pertinent to current issue.    Social History:  reports that she quit smoking about 7 months ago. She started smoking about 33 years ago. She has a 7.50 pack-year smoking history. She has never used smokeless tobacco. She reports current alcohol use. She reports that she does not use drugs.    Home Medications:  Apixaban Starter Pack, acetaminophen, docusate sodium, ferrous sulfate, folic acid, multivitamin with minerals, norethindrone, sertraline, and vitamin B-12    Allergies:  Allergies   Allergen Reactions   • Phenergan [Promethazine] GI Intolerance       Objective   Objective     Vitals:   Temp:  [98.8 °F (37.1 °C)] 98.8 °F (37.1 °C)  Heart Rate:  [74-89] 74  Resp:  [16] 16  BP: (112-136)/(69-86) 112/86  Physical Exam     GENERAL: 51 y.o. YO female a/o x 4, NAD  SKIN: Warm pink and dry   HEENT:  PERRL, EOM intact, conjunctiva normal, sclera clear  NECK: supple, no JVD  LUNGS: Clear to auscultation bilaterally without wheezes, rales or rhonchi.  No accessory muscle use and no nasal flaring.  CARDIAC:  Regular rate and rhythm, S1-S2.  No murmurs, rubs or gallops.  No peripheral edema.  Equal pulses bilaterally.  ABDOMEN: Soft, nontender, nondistended.  No guarding or rebound tenderness.  Normal bowel sounds.  MUSCULOSKELETAL: Moves all extremities well.  No deformity.  NEURO: Cranial nerves II through XII grossly intact.  No gross focal deficits.  Alert.  Normal speech and motor.  PSYCH: Normal mood and affect      Result Review    Result Review:  I have personally reviewed the results from the time of this admission to 5/12/2022 21:31 EDT and agree with these findings:  [x]  Laboratory  []  Microbiology  [x]  Radiology  []  EKG/Telemetry   []  Cardiology/Vascular   []  Pathology  [x]  Old records  []  Other:  Most notable findings include: hemoglobin 8.8, down from 9.4 eight days ago, hematocrit 28.1, INR  1.12    Patient was admitted 4/26/2022 with a hemoglobin of 6.9.  She had a CT scan which showed endometrial thickening and possible adenomyosis.  She also had a pelvic ultrasound which showed possible uterine fibroid possibly impinging on the endometrial cavity.  Assessment & Plan   Assessment / Plan     Brief Patient Summary:  Biju Harrington is a 51 y.o. female who is being admitted to observation unit for further evaluation of abnormal uterine bleeding.    Active Hospital Problems:  Active Hospital Problems    Diagnosis    • Uterine bleeding, dysfunctional      Plan:     1.  Abnormal uterine bleeding with anemia  -Hemoglobin 8.8, every 6 hemoglobin and hematocrit  -Consult OB/GYN  -Hold Eliquis  -Continue Aygestin  -N.p.o. after midnight, IVF  -Telemetry monitoring  -Monitor    2.  Right ulnar vein DVT  -Hold Eliquis  -Patient has outpatient appointment with hematology on Monday    3.  Anxiety and depression  -Continue medication as prescribed      DVT prophylaxis:  Mechanical DVT prophylaxis orders are present.    CODE STATUS:    Level Of Support Discussed With: Patient  Code Status (Patient has no pulse and is not breathing): CPR (Attempt to Resuscitate)  Medical Interventions (Patient has pulse or is breathing): Full Support    Admission Status:  I believe this patient meets observation status.    I wore an N95 mask, face shield, and gloves during this patient encounter. Patient also wearing a surgical mask throughout encounter. Hand hygeine performed before and after seeing the patient.    Electronically signed by TAHIR Du, 05/12/22, 9:03 PM EDT.

## 2022-05-13 NOTE — OP NOTE
OPERATIVE REPORT     Pre-Operative Diagnosis: Abnormal uterine bleeding, uterine fibroid vs adenomyosis     Post-Operative Diagnosis: Abnormal uterine bleeding, endometrial mass- benign decidualized endometrium and endometrial polyp on Frozen section     Procedure: Hysteroscopy, Dilation and Curettage, Endometrial Resection with Myosure, Endometrial Ablation with Novasure     Surgeon: Jia Russell MD     Anesthetic: GETA    Estimated Blood Loss: 25 cc      Complications: no complications were noted.    Disposition: PACU - hemodynamically stable. Will admit patient to observation following procedure.      Findings: 50 cc of blood clot in the vaginal vault prior to starting the procedure. Normal sized anteverted uterus with no adnexal masses. Normal appearing cervix. Uterine cavity contained popcorn appearing endometrial mass protruding slightly into the endometrial cavity and occupying most of the anterior uterine wall. This mass returned as decidualized reaction endometrium on frozen pathology. Small < 5 mm polyp noted along the posterior uterine wall. Bilateral tubal ostia seen.     Indications for surgery: Biju Harrington is a 51 y.o.   who presented to ED yesterday for heavy vaginal bleeding requiring blood transfusion for anemia to Hgb 7.1. The patient was previously seen on 22 for same bleeding issues and had a Hgb of 6.9 at the time and required a blood transfusion during that hospitalization. Her workup at the time noted a thickened endometrium and possible adenomyosis on CT scan that was followed by a pelvic ultrasound that noted likely intramural anterior fibroid. She was discharged home on Aygestin for management of bleeding symptoms and set up for outpatient follow up. During the interim, the patient developed right arm pain and was diagnosed with superficial thrombophlebitis of right arm and  acute DVT of the right ulnar vein at the IV site and was then started on Eliquis. Given her ongoing  bleeding symptoms, it was felt best to proceed with hysteroscopy, dilation and curettage, possible myomectomy with Myosure, and possible endometrial ablation with Novasure for ongoing bleeding requiring blood transfusion and no improvement with Aygestin. The patient agreed and consented for procedure. Risks, benefits, alternatives, and complications were discussed in detail. Patient's questions were answered to her satisfaction prior to the procedure.     Details of Procedure:   The patient was taken to the operating room and positioned supine. General anesthesia was then administered. She was positioned in dorsal lithotomy and prepped and draped in usual sterile fashion. A timeout was performed. An open-sided speculum were placed into the vaginal and the cervix was visualized. The cervix was grasped on the anterior lip with a single toothed tenaculum and easily dilated to accommodate the 0 degree hysteroscope. The hysteroscope was then passed into the uterine cavity and distension revealed the aforementioned findings. The Myosure Reach was then used to resect the endometrial mass of the anterior uterine wall and the posterior endometrial polyp.The hysteroscope was then removed and a  sharp curettage was performed with moderate amount of currettings. These specimens were sent for frozen section because the mass had a suspicious appearance for hyperplasia or malignancy and diagnosis of decidualized endometrium and endometrial polyp returned. The decision to proceed with endometrial ablation was then made. The cavity was then sounded to 5 cm.  The Novasure device was placed within the cavity and the width was noted to be 4.6 cm.  The cavity was interrogated and passed the cavity assessment. The device was then activated and ran for a total of 1:12 min with Power of 127 W.  The Novasure was then removed, and the hysteroscope was passed back into the cavity.  Good distension was noted and the cavity was noted to be 90%  ablated.  The hyseroscope was then removed.  The fluid deficit was 100 mL.  The tenaculum was removed from the cervix and hemostasis of the tenaculum sites was obtained with pressure. The patient was then positioned supine and awoken from general anesthesia and taken to the PACU in good condition. Pictures were taken throughout.    Jia Russell MD

## 2022-05-13 NOTE — ANESTHESIA POSTPROCEDURE EVALUATION
"Patient: Biju Harrington    Procedure Summary     Date: 05/13/22 Room / Location: [facility] OR 03 / [facility] MAIN OR    Anesthesia Start: 1458 Anesthesia Stop: 1623    Procedure: DILATATION AND CURETTAGE, HYSTEROSCOPY NOVASURE ENDOMETRIAL ABLATION, endometrial resection with myosure (N/A Vagina) Diagnosis:     Surgeons: Jia Russell MD Provider: Barber Jarvis MD    Anesthesia Type: general ASA Status: 2 - Emergent          Anesthesia Type: general    Vitals  Vitals Value Taken Time   /67 05/13/22 1721   Temp 36.4 °C (97.6 °F) 05/13/22 1620   Pulse 68 05/13/22 1728   Resp 18 05/13/22 1620   SpO2 100 % 05/13/22 1728   Vitals shown include unvalidated device data.        Post Anesthesia Care and Evaluation    Patient location during evaluation: bedside  Patient participation: complete - patient participated  Level of consciousness: awake and alert  Pain management: adequate  Airway patency: patent  Anesthetic complications: No anesthetic complications    Cardiovascular status: acceptable  Respiratory status: acceptable  Hydration status: acceptable    Comments: /64   Pulse 63   Temp 36.4 °C (97.6 °F) (Oral)   Resp 18   Ht 157.5 cm (62.01\")   Wt 62.6 kg (138 lb)   LMP 05/12/2022   SpO2 98%   BMI 25.23 kg/m²       "

## 2022-05-14 ENCOUNTER — READMISSION MANAGEMENT (OUTPATIENT)
Dept: CALL CENTER | Facility: HOSPITAL | Age: 52
End: 2022-05-14

## 2022-05-14 VITALS
HEIGHT: 62 IN | OXYGEN SATURATION: 98 % | DIASTOLIC BLOOD PRESSURE: 70 MMHG | RESPIRATION RATE: 18 BRPM | BODY MASS INDEX: 25.4 KG/M2 | SYSTOLIC BLOOD PRESSURE: 131 MMHG | HEART RATE: 82 BPM | TEMPERATURE: 98.6 F | WEIGHT: 138 LBS

## 2022-05-14 LAB
ANION GAP SERPL CALCULATED.3IONS-SCNC: 9 MMOL/L (ref 5–15)
BUN SERPL-MCNC: 9 MG/DL (ref 6–20)
BUN/CREAT SERPL: 14.3 (ref 7–25)
CALCIUM SPEC-SCNC: 7.7 MG/DL (ref 8.6–10.5)
CHLORIDE SERPL-SCNC: 108 MMOL/L (ref 98–107)
CO2 SERPL-SCNC: 20 MMOL/L (ref 22–29)
CREAT SERPL-MCNC: 0.63 MG/DL (ref 0.57–1)
DEPRECATED RDW RBC AUTO: 77.9 FL (ref 37–54)
EGFRCR SERPLBLD CKD-EPI 2021: 108.9 ML/MIN/1.73
ERYTHROCYTE [DISTWIDTH] IN BLOOD BY AUTOMATED COUNT: 24.2 % (ref 12.3–15.4)
GLUCOSE SERPL-MCNC: 174 MG/DL (ref 65–99)
HCT VFR BLD AUTO: 23.8 % (ref 34–46.6)
HGB BLD-MCNC: 7.1 G/DL (ref 12–15.9)
MCH RBC QN AUTO: 26.4 PG (ref 26.6–33)
MCHC RBC AUTO-ENTMCNC: 29.8 G/DL (ref 31.5–35.7)
MCV RBC AUTO: 88.5 FL (ref 79–97)
PLATELET # BLD AUTO: 344 10*3/MM3 (ref 140–450)
PMV BLD AUTO: 11.3 FL (ref 6–12)
POTASSIUM SERPL-SCNC: 3.8 MMOL/L (ref 3.5–5.2)
RBC # BLD AUTO: 2.69 10*6/MM3 (ref 3.77–5.28)
SODIUM SERPL-SCNC: 137 MMOL/L (ref 136–145)
WBC NRBC COR # BLD: 8.52 10*3/MM3 (ref 3.4–10.8)

## 2022-05-14 PROCEDURE — 99204 OFFICE O/P NEW MOD 45 MIN: CPT | Performed by: INTERNAL MEDICINE

## 2022-05-14 PROCEDURE — 85027 COMPLETE CBC AUTOMATED: CPT | Performed by: STUDENT IN AN ORGANIZED HEALTH CARE EDUCATION/TRAINING PROGRAM

## 2022-05-14 PROCEDURE — G0378 HOSPITAL OBSERVATION PER HR: HCPCS

## 2022-05-14 PROCEDURE — 99217 PR OBSERVATION CARE DISCHARGE MANAGEMENT: CPT | Performed by: OBSTETRICS & GYNECOLOGY

## 2022-05-14 PROCEDURE — 80048 BASIC METABOLIC PNL TOTAL CA: CPT | Performed by: STUDENT IN AN ORGANIZED HEALTH CARE EDUCATION/TRAINING PROGRAM

## 2022-05-14 RX ORDER — HYDROCODONE BITARTRATE AND ACETAMINOPHEN 5; 325 MG/1; MG/1
1 TABLET ORAL EVERY 6 HOURS PRN
Qty: 20 TABLET | Refills: 0 | Status: SHIPPED | OUTPATIENT
Start: 2022-05-14 | End: 2022-06-01 | Stop reason: SDDI

## 2022-05-14 RX ADMIN — Medication 1000 MCG: at 08:55

## 2022-05-14 RX ADMIN — SERTRALINE HYDROCHLORIDE 50 MG: 50 TABLET, FILM COATED ORAL at 08:55

## 2022-05-14 RX ADMIN — ACETAMINOPHEN 1000 MG: 500 TABLET ORAL at 06:05

## 2022-05-14 RX ADMIN — ACETAMINOPHEN 1000 MG: 500 TABLET ORAL at 14:39

## 2022-05-14 RX ADMIN — FOLIC ACID 1 MG: 1 TABLET ORAL at 08:55

## 2022-05-14 RX ADMIN — APIXABAN 10 MG: 5 TABLET, FILM COATED ORAL at 01:39

## 2022-05-14 RX ADMIN — APIXABAN 10 MG: 5 TABLET, FILM COATED ORAL at 12:17

## 2022-05-14 NOTE — PLAN OF CARE
Problem: Adult Inpatient Plan of Care  Goal: Plan of Care Review  Outcome: Ongoing, Progressing  Flowsheets (Taken 5/14/2022 0120)  Plan of Care Reviewed With:   patient   spouse  Outcome Evaluation: received from PACU @ 1958, awake and alert, post op pain 5/10 mostly cramping, already voided in PACU, post op plan of care explained, on Regular diet advanced as tolerated, on scheduled Tylenol, voided small amount, oral fluids encouraged, ambulated, small vaginal bleeding noted, will monitor H and H, VSS, Hematology consult placed   Goal Outcome Evaluation:  Plan of Care Reviewed With: patient, spouse           Outcome Evaluation: received from PACU @ 1958, awake and alert, post op pain 5/10 mostly cramping, already voided in PACU, post op plan of care explained, on Regular diet advanced as tolerated, on scheduled Tylenol, voided small amount, oral fluids encouraged, ambulated, small vaginal bleeding noted, will monitor H and H, VSS, Hematology consult placed

## 2022-05-14 NOTE — DISCHARGE SUMMARY
Date of Discharge:  5/14/2022    Discharge Diagnosis: AUB, anemia     Presenting Problem/History of Present Illness  DUB (dysfunctional uterine bleeding) [N93.8]  Uterine bleeding, dysfunctional [N93.8]  Acute deep vein thrombosis (DVT) of ulnar vein of right upper extremity (HCC) [I82.621]  Anemia, unspecified type [D64.9]       Hospital Course  Patient is a 51 y.o. female with severe anemia related to AUB exacerbated by anti-coagulation for acute DVT. Underwent transfusion and ablation. Doing better today. Okay to consider further outpatient management if okay with hematology.     Procedures Performed  Procedure(s):  DILATATION AND CURETTAGE, HYSTEROSCOPY NOVASURE ENDOMETRIAL ABLATION, endometrial resection with myosure       Consults:   Consults     Date and Time Order Name Status Description    5/13/2022 12:34 AM Hematology & Oncology Inpatient Consult      5/13/2022 12:34 AM Inpatient Obstetrics / Gynecology Consult            Pertinent Test Results: Hg of 7.1 grams (from 7.2 grams last night)    Condition on Discharge:  Stable     Discharge Disposition  Home or Self Care    Discharge Medications     Discharge Medications      New Medications      Instructions Start Date   HYDROcodone-acetaminophen 5-325 MG per tablet  Commonly known as: NORCO   1 tablet, Oral, Every 6 Hours PRN         Continue These Medications      Instructions Start Date   acetaminophen 325 MG tablet  Commonly known as: TYLENOL   650 mg, Oral, Every 6 Hours PRN      Apixaban Starter Pack tablet therapy pack   5 mg, Oral, Take As Directed      ferrous sulfate 325 (65 Fe) MG tablet  Commonly known as: FerrouSul   325 mg, Oral, Daily With Breakfast      folic acid 1 MG tablet  Commonly known as: FOLVITE   1 mg, Oral, Daily      multivitamin with minerals tablet tablet   1 tablet, Oral, Daily, OTC      norethindrone 5 MG tablet  Commonly known as: Aygestin   5 mg, Oral, Daily      sertraline 50 MG tablet  Commonly known as: Zoloft   50 mg,  Oral, Daily      vitamin B-12 1000 MCG tablet  Commonly known as: CYANOCOBALAMIN   1,000 mcg, Oral, Daily         ASK your doctor about these medications      Instructions Start Date   docusate sodium 50 MG capsule  Commonly known as: COLACE   100 mg, Oral, 2 Times Daily PRN, OTC             Discharge Diet:   Diet Instructions     Advance Diet As Tolerated            Activity at Discharge:   Activity Instructions     Pelvic Rest            Follow-up Appointments  Future Appointments   Date Time Provider Department Center   5/16/2022 10:40 AM LAB CHAIR 1 [facility]   5/16/2022 11:20 AM Ridge Montelongo MD [facility]   5/26/2022  3:15 PM Jia Russell MD [facility]     Additional Instructions for the Follow-ups that You Need to Schedule     Discharge Follow-up with Specialty: Dr. Russell; 2 Weeks   As directed      Specialty: Dr. Russell    Follow Up: 2 Weeks               Test Results Pending at Discharge  Pending Labs     Order Current Status    Tissue Pathology Exam In process           Lorenzo Wright MD  05/14/22  10:23 EDT

## 2022-05-14 NOTE — PROGRESS NOTES
[facility]     Progress Note    Patient Name: Biju Harrington  : 1970  MRN: [id number]  Primary Care Physician:  Suzanne Kimball APRN  Date of admission: 2022    Subjective   Subjective     Chief Complaint:   Chief Complaint   Patient presents with   • Vaginal Bleeding         History of Present Illness  51 y.o. - admitted with severe anemia from abnormal uterine bleeding complicated by needing anti-coagulation for active DVT. Given blood and yesterday underwent endometrial ablation to try and assist control of her AUB. This morning with spotting, and feeling well overall. Of note Dr. Russell wanted hematology evaluation to give recommendations on choice of anti-coagulant.  She has yet to be seen for that.      Review of Systems   Respiratory: Negative for shortness of breath.    Cardiovascular: Negative for chest pain.   Gastrointestinal: Negative for abdominal pain.   Genitourinary: Positive for vaginal bleeding. Negative for pelvic pain.   Neurological: Negative for light-headedness.       Objective   Objective     Vitals:   Temp:  [96.9 °F (36.1 °C)-98.7 °F (37.1 °C)] 98.6 °F (37 °C)  Heart Rate:  [60-88] 82  Resp:  [16-18] 18  BP: ()/(55-82) 131/70  Flow (L/min):  [2] 2    Physical Exam  Vitals reviewed.   Constitutional:       General: She is not in acute distress.     Appearance: Normal appearance. She is normal weight.   Pulmonary:      Effort: Pulmonary effort is normal.   Abdominal:      General: Abdomen is flat. There is no distension.      Palpations: Abdomen is soft.      Tenderness: There is no abdominal tenderness.   Musculoskeletal:         General: No tenderness.      Right lower leg: No edema.      Left lower leg: No edema.   Neurological:      General: No focal deficit present.      Mental Status: She is alert.          Result Review      Lab Results   Component Value Date    WBC 8.52 2022    HGB 7.1 (L) 2022    HCT 23.8 (L) 2022    MCV 88.5 2022      05/14/2022     Hg last evening was 7.2 grams     Assessment & Plan   Assessment / Plan     Brief Patient Summary:  Biju Harrington is a 51 y.o. female   1) AUB - exacerbated by anti-coagulation. Improved on Aygestin and post ablation   2) Acute DVT - on eliquis that was restarted yesterday   3) Severe anemia - Hg stable at this point.     Active Hospital Problems:  Active Hospital Problems    Diagnosis    • Acute deep vein thrombosis (DVT) of ulnar vein of right upper extremity (HCC)    • Anemia    • Uterine bleeding, dysfunctional    • DUB (dysfunctional uterine bleeding)      Plan:   If okay with hematology would discharge home to follow up soon with Dr. Russell in office to determine next steps. Since have slowed acute bleeding episode significantly with ablation. Will defer to hematology     DVT prophylaxis:  Medical and mechanical DVT prophylaxis orders are present.    CODE STATUS:    Level Of Support Discussed With: Patient  Code Status (Patient has no pulse and is not breathing): CPR (Attempt to Resuscitate)  Medical Interventions (Patient has pulse or is breathing): Full Support    Disposition:  I expect patient to be discharged today.    Lorenzo Wright MD  5/14/2022   10:18 EDT

## 2022-05-14 NOTE — PLAN OF CARE
Goal Outcome Evaluation:              Outcome Evaluation: VSS. Scheduled Tyelnol given for pain. Hgb: 7.1 this morning .Seen by Hem/Onc, okay to discharge from standpoint. Discharged home today.

## 2022-05-16 ENCOUNTER — TRANSITIONAL CARE MANAGEMENT TELEPHONE ENCOUNTER (OUTPATIENT)
Dept: CALL CENTER | Facility: HOSPITAL | Age: 52
End: 2022-05-16

## 2022-05-16 ENCOUNTER — PATIENT MESSAGE (OUTPATIENT)
Dept: OBSTETRICS AND GYNECOLOGY | Facility: CLINIC | Age: 52
End: 2022-05-16

## 2022-05-16 ENCOUNTER — APPOINTMENT (OUTPATIENT)
Dept: OTHER | Facility: HOSPITAL | Age: 52
End: 2022-05-16

## 2022-05-16 ENCOUNTER — PATIENT ROUNDING (BHMG ONLY) (OUTPATIENT)
Dept: OBSTETRICS AND GYNECOLOGY | Facility: CLINIC | Age: 52
End: 2022-05-16

## 2022-05-16 DIAGNOSIS — D62 ACUTE BLOOD LOSS ANEMIA: ICD-10-CM

## 2022-05-16 DIAGNOSIS — I82.621 ACUTE DEEP VEIN THROMBOSIS (DVT) OF ULNAR VEIN OF RIGHT UPPER EXTREMITY: Primary | ICD-10-CM

## 2022-05-16 LAB
BH BB BLOOD EXPIRATION DATE: NORMAL
BH BB BLOOD EXPIRATION DATE: NORMAL
BH BB BLOOD TYPE BARCODE: 6200
BH BB BLOOD TYPE BARCODE: 6200
BH BB DISPENSE STATUS: NORMAL
BH BB DISPENSE STATUS: NORMAL
BH BB PRODUCT CODE: NORMAL
BH BB PRODUCT CODE: NORMAL
BH BB UNIT NUMBER: NORMAL
BH BB UNIT NUMBER: NORMAL
CROSSMATCH INTERPRETATION: NORMAL
CROSSMATCH INTERPRETATION: NORMAL
UNIT  ABO: NORMAL
UNIT  ABO: NORMAL
UNIT  RH: NORMAL
UNIT  RH: NORMAL

## 2022-05-16 NOTE — OUTREACH NOTE
Call Center TCM Note    Flowsheet Row Responses   [facility] [place] patient discharged from? [place]   Does the patient have one of the following disease processes/diagnoses(primary or secondary)? Other   TCM attempt successful? Yes   Call start time 1143   Call end time 1148   Discharge diagnosis DILATATION AND CURETTAGE, HYSTEROSCOPY NOVASURE ENDOMETRIAL ABLATION   Meds reviewed with patient/caregiver? Yes   Is the patient having any side effects they believe may be caused by any medication additions or changes? No   Does the patient have all medications ordered at discharge? Yes   Is the patient taking all medications as directed (includes completed medication regime)? Yes   Medication comments Pt did not need the pain med   Does the patient have a primary care provider?  Yes   Does the patient have an appointment with their PCP within 7 days of discharge? Yes   Comments regarding PCP HOSP DC FU MY CHART video visit 5/19/22 @ 2:45 pm.    Has the patient kept scheduled appointments due by today? N/A   Has home health visited the patient within 72 hours of discharge? N/A   Psychosocial issues? No   Did the patient receive a copy of their discharge instructions? Yes   Nursing interventions Reviewed instructions with patient   What is the patient's perception of their health status since discharge? Improving  [Pt reports she is much improved, however still has small amt of bleeding. Pt is awre of when to call GYN . ]   Is the patient/caregiver able to teach back signs and symptoms related to disease process for when to call PCP? Yes   Is the patient/caregiver able to teach back signs and symptoms related to disease process for when to call 911? Yes   Is the patient/caregiver able to teach back the hierarchy of who to call/visit for symptoms/problems? PCP, Specialist, Home health nurse, Urgent Care, ED, 911 Yes   TCM call completed? Yes   Wrap up additional comments Pt reports that she is doing well overall. States  bleeding is light and she is not needing pain med, just Tylenol. Pt is aware of return precautions.           Michelle Mcconnell RN    5/16/2022, 11:50 EDT

## 2022-05-16 NOTE — PROGRESS NOTES
My chart message has been sent to the patient for PATIENT ROUNDING with [facility].

## 2022-05-16 NOTE — PROGRESS NOTES
Case Management Discharge Note      Final Note: Discharged home. Nadia Nguyen, KELI         Transportation Services  Private: Car    Final Discharge Disposition Code: 01 - home or self-care

## 2022-05-17 ENCOUNTER — TRANSCRIBE ORDERS (OUTPATIENT)
Dept: ADMINISTRATIVE | Facility: HOSPITAL | Age: 52
End: 2022-05-17

## 2022-05-17 ENCOUNTER — TELEPHONE (OUTPATIENT)
Dept: ONCOLOGY | Facility: CLINIC | Age: 52
End: 2022-05-17

## 2022-05-17 DIAGNOSIS — I82.621: Primary | ICD-10-CM

## 2022-05-17 LAB
LAB AP CASE REPORT: NORMAL
Lab: NORMAL
PATH REPORT.FINAL DX SPEC: NORMAL
PATH REPORT.GROSS SPEC: NORMAL

## 2022-05-17 NOTE — TELEPHONE ENCOUNTER
PT WAS SCHEDULED OUT FOR HER SEVERAL WEEKS APPT,  PT CALLED IN FOR AN EARLIER APPT DATE AND TIME FOR HER US AND HER APPT WITH DR ORTEGA - PT APPTS MOVED TO SOONER APPTS

## 2022-05-17 NOTE — TELEPHONE ENCOUNTER
----- Message from Adiel Odom MD sent at 5/14/2022  1:27 PM EDT -----  Regarding: Hospital follow-up   This patient was scheduled to see Dr. Montelongo as a new patient in the office on 5/16.  I saw her during an admission over the weekend.  Please cancel the appointment with Dr. Montelongo.    Please order a right upper extremity venous duplex in several weeks.  I would like to see her back a few days after that.  CBC, reticulocyte count, ferritin, iron panel at the appointment with me.    Diagnosis is acute blood loss anemia, right upper extremity DVT

## 2022-05-17 NOTE — TELEPHONE ENCOUNTER
Caller: Jose Harrington    Relationship: Self    Best call back number: [phone]    What is the best time to reach you: ANYTIME     CAN LEAVE VOICEMAIL IF NO ANSWER.     Who are you requesting to speak with (clinical staff, provider,  specific staff member): DR ORTEGA OR HIS NURSE      What was the call regarding:     WAS IN HOSPITAL AND SEEN DR ORTEGA, NEEDING TO MAKE FOLLOW UP APPOINTMENT AND TO GET ULTRASOUND DONE AND LABS.       ADVISED LABS AND HOSPITAL F/U SCHEDULED 06/21    PATIENT STATED ON PAPER WORK STATES LABS TO BE COMPLETED BY 05/30 , WANTED TO FIND OUT IF NEEDING TO COMPLETE THESE  PRIOR OR IF IS OK TO COMPLETE  ON 06/21?     Do you require a callback: YES      ADDITIONAL NOTE:      JOSE WANTED TO SEE IF CAN GO AHEAD AND GET     US VENOUS DOPPLER UPPER EXTREMITY RIGHT (DUPLEX)    SCHEDULED, ADVISED [facility] CENTRAL SCHEDULING NUMBER: [phone] WARM TRANSFERRED TO [place] IN CENTRAL SCHEDULING TO FURTHER ASSIST.

## 2022-05-19 ENCOUNTER — TELEMEDICINE (OUTPATIENT)
Dept: FAMILY MEDICINE CLINIC | Facility: CLINIC | Age: 52
End: 2022-05-19

## 2022-05-19 DIAGNOSIS — F33.2 SEVERE EPISODE OF RECURRENT MAJOR DEPRESSIVE DISORDER, WITHOUT PSYCHOTIC FEATURES: ICD-10-CM

## 2022-05-19 DIAGNOSIS — D50.0 IRON DEFICIENCY ANEMIA DUE TO CHRONIC BLOOD LOSS: ICD-10-CM

## 2022-05-19 DIAGNOSIS — Z09 HOSPITAL DISCHARGE FOLLOW-UP: Primary | ICD-10-CM

## 2022-05-19 DIAGNOSIS — I82.621 ACUTE DEEP VEIN THROMBOSIS (DVT) OF ULNAR VEIN OF RIGHT UPPER EXTREMITY: ICD-10-CM

## 2022-05-19 DIAGNOSIS — N93.8 DUB (DYSFUNCTIONAL UTERINE BLEEDING): ICD-10-CM

## 2022-05-19 PROCEDURE — 99214 OFFICE O/P EST MOD 30 MIN: CPT | Performed by: NURSE PRACTITIONER

## 2022-05-19 RX ORDER — SERTRALINE HYDROCHLORIDE 100 MG/1
100 TABLET, FILM COATED ORAL DAILY
Qty: 30 TABLET | Refills: 0 | Status: SHIPPED | OUTPATIENT
Start: 2022-05-19 | End: 2022-06-13

## 2022-05-19 NOTE — PROGRESS NOTES
Transitional Care Follow Up Visit  Subjective     Biju Harrington is a 51 y.o. female who presents for a transitional care management visit.    The use of a video visit has been reviewed with the patient and verbal informed consent has been obtained. The patient was present in her home, and the provider was present in the office. The visit was completed using [employer]. Unable to connect with patient via [employer] [facility] [employer].     Within 48 business hours after discharge our office contacted her via telephone to coordinate her care and needs.      I reviewed and discussed the details of that call along with the discharge summary, hospital problems, inpatient lab results, inpatient diagnostic studies, and consultation reports with Biju.     Current outpatient and discharge medications have been reconciled for the patient.  Reviewed by: Suzanne Kimball, APRN      Date of TCM Phone Call 5/14/2022   [facility]    Date of Admission 5/12/2022   Date of Discharge 5/14/2022   Discharge Disposition Home or Self Care     Risk for Readmission (LACE) Score: 3 (5/14/2022  6:01 AM)      History of Present Illness   Course During Hospital Stay:      After seeing me last she started Eliquis - she started it Tuesday night and took it Wednesday morning as well. Wednesday while at work she started bleeding heavily. She had to change her pad Q30 minutes. She saw the GYN on Thursday and while there she changed her pad at least 3 times in 30 minutes. The GYN upped her estrogen from 5mg to 10mg and they discussed options for treatment - but she felt like there was a dilemma d/t her being on the blood thinner. But by the appointment the patient had stopped the Eliquis. She went home and went up the stairs to use the restroom and a blood clot hit the bathroom floor. Her  told her that she didn't have a choice but to go to the ER.     She went to the ER on the 12th - she was admitted for observation -she had to get  another transfusion, and then the next day it was decided to do the ablation. She got up out of the bed to go to the bathroom that AM at 5 and she left a puddle of blood from the bed to the bathroom.     She is currently taking Eliquis 10mg BID - it will drop to 5mg BID in the next few days. Her uterine bleeding has improved s/p ablation - she is now currently only spotting. She is not even wearing a pad. She is wearing a panty liner and maybe changing it QID, more for hygiene purposes than it being soiled. She states that they really do not know why she is bleeding like this; if it was from fibroids or something else. She will go back to GYN (Dr. Russell) on the 26th of May.     She will see hematology on June 1st - They want labs prior to that appointment - as well as a repeat US of the RUE DVT. The US is on 05/31.     She feels like things are much better - she is fatigued, but hopes that will improve as her blood replenishes. She received 1 transfusion this time - only blood, not iron. She will start oral iron at the end of the month. She has mild cramping, but Tylenol helps with that. She doesn't want to take IBU to avoid any additional chances of bleeding.     From a mental standpoint she feels like the Zoloft is helping/working. She would like to go ahead and bump up the dose to 100mg daily. She used to take up to 150mg. She will be establishing with a new PCP at the end of June in [place], which is where she now lives. She is no longer living in [facility].      The following portions of the patient's history were reviewed and updated as appropriate: allergies, current medications, past family history, past medical history, past social history, past surgical history and problem list.    Review of Systems    Objective      Physical Exam  Vitals reviewed.   Constitutional:       General: She is not in acute distress.     Appearance: Normal appearance. She is well-developed. She is not ill-appearing.   HENT:       Head: Normocephalic and atraumatic.   Eyes:      General: No scleral icterus.     Conjunctiva/sclera: Conjunctivae normal.   Pulmonary:      Effort: Pulmonary effort is normal. No respiratory distress.   Musculoskeletal:         General: Normal range of motion.   Skin:     Coloration: Skin is not jaundiced or pale.   Neurological:      Mental Status: She is alert and oriented to person, place, and time.   Psychiatric:         Mood and Affect: Mood and affect normal.         Behavior: Behavior normal.         Thought Content: Thought content normal.         Judgment: Judgment normal.       ED to Hosp-Admission (Discharged) with Jia Russell MD; Rah Selby MD; José Luis Dhillno MD (05/12/2022)  Transitional Care Management Telephone Encounter with Michelle Mcconnell RN (05/16/2022)    Common labs    Common Labsle 5/12/22 5/12/22 5/13/22 5/13/22 5/13/22 5/13/22 5/13/22 5/13/22 5/14/22 5/14/22    1844 1844 0005 0434 0434 0823 1217 1757 0459 0459   Glucose  138 (A)   107 (A)     174 (A)   BUN  10   10     9   Creatinine  0.71   0.66     0.63   Sodium  140   139     137   Potassium  4.0   4.1     3.8   Chloride  107   109 (A)     108 (A)   Calcium  8.8   7.9 (A)     7.7 (A)   Albumin  4.20           Total Bilirubin  0.2           Alkaline Phosphatase  34 (A)           AST (SGOT)  14           ALT (SGPT)  10           WBC 6.06  6.76      8.52    Hemoglobin 8.8 (A)  7.1 (A) 8.1 (A)  8.1 (A) 7.8 (A) 7.2 (A) 7.1 (A)    Hematocrit 28.1 (A)  24.7 (A) 26.2 (A)  25.5 (A) 24.5 (A) 24.5 (A) 23.8 (A)    Platelets 421  335      344    (A) Abnormal value       Comments are available for some flowsheets but are not being displayed.           Duplex Venous Upper Extremity - Right CAR (05/06/2022 13:45)      Assessment & Plan      Diagnoses and all orders for this visit:    1. Hospital discharge follow-up (Primary)    2. DUB (dysfunctional uterine bleeding)  Comments:  Will see GYN at the end of the month to follow up  after ablation    3. Iron deficiency anemia due to chronic blood loss  Comments:  to start oral iron at the end of the month - will see hematology 06/01    4. Acute deep vein thrombosis (DVT) of ulnar vein of right upper extremity (HCC)  Comments:  F/U with hematology on 06/01    5. Severe episode of recurrent major depressive disorder, without psychotic features (HCC)  -     sertraline (Zoloft) 100 MG tablet; Take 1 tablet by mouth Daily.  Dispense: 30 tablet; Refill: 0      Return if symptoms worsen or fail to improve.     Follow up with GYN and hematology as scheduled. Increase Zoloft to 100mg daily - she will establish with new PCP next month, but will reach out to me with any concerns or need between now and then.

## 2022-05-23 ENCOUNTER — TELEPHONE (OUTPATIENT)
Dept: ONCOLOGY | Facility: HOSPITAL | Age: 52
End: 2022-05-23

## 2022-05-23 ENCOUNTER — DOCUMENTATION (OUTPATIENT)
Dept: ONCOLOGY | Facility: HOSPITAL | Age: 52
End: 2022-05-23

## 2022-05-23 NOTE — PROGRESS NOTES
[employer] denies pts Duplex. The request will be re-submitted with further explanation on why its needed prior to 3 months.

## 2022-05-23 NOTE — TELEPHONE ENCOUNTER
Akila Mcconnell with [facility] states pts insurance denied the duplex doppler and a peer to peer is needed. Anaya Luis unformed along with Dr. Odom.

## 2022-05-23 NOTE — TELEPHONE ENCOUNTER
Returned call to Akila Mcconnell with [employer] team. Left a voicemail with my info. Needing to obtain peer to peer info for doppler she is working on the PA for.

## 2022-05-25 DIAGNOSIS — I82.621 ACUTE DEEP VEIN THROMBOSIS (DVT) OF ULNAR VEIN OF RIGHT UPPER EXTREMITY: Primary | ICD-10-CM

## 2022-05-26 ENCOUNTER — OFFICE VISIT (OUTPATIENT)
Dept: OBSTETRICS AND GYNECOLOGY | Facility: CLINIC | Age: 52
End: 2022-05-26

## 2022-05-26 VITALS
WEIGHT: 138 LBS | BODY MASS INDEX: 25.4 KG/M2 | SYSTOLIC BLOOD PRESSURE: 108 MMHG | DIASTOLIC BLOOD PRESSURE: 55 MMHG | HEIGHT: 62 IN

## 2022-05-26 DIAGNOSIS — Z98.890 S/P ENDOMETRIAL ABLATION: ICD-10-CM

## 2022-05-26 DIAGNOSIS — Z09 POSTOPERATIVE FOLLOW-UP: Primary | ICD-10-CM

## 2022-05-26 PROCEDURE — 99212 OFFICE O/P EST SF 10 MIN: CPT | Performed by: STUDENT IN AN ORGANIZED HEALTH CARE EDUCATION/TRAINING PROGRAM

## 2022-05-30 DIAGNOSIS — N95.1 PERIMENOPAUSAL VASOMOTOR SYMPTOMS: ICD-10-CM

## 2022-05-30 DIAGNOSIS — R45.86 MOOD CHANGES: ICD-10-CM

## 2022-05-30 DIAGNOSIS — R45.4 IRRITABILITY: ICD-10-CM

## 2022-05-31 ENCOUNTER — HOSPITAL ENCOUNTER (OUTPATIENT)
Dept: CARDIOLOGY | Facility: HOSPITAL | Age: 52
Discharge: HOME | End: 2022-05-31
Admitting: INTERNAL MEDICINE

## 2022-05-31 ENCOUNTER — APPOINTMENT (OUTPATIENT)
Dept: ULTRASOUND IMAGING | Facility: HOSPITAL | Age: 52
End: 2022-05-31

## 2022-05-31 DIAGNOSIS — I82.621 ACUTE DEEP VEIN THROMBOSIS (DVT) OF ULNAR VEIN OF RIGHT UPPER EXTREMITY: ICD-10-CM

## 2022-05-31 LAB
BH CV UPPER VENOUS LEFT INTERNAL JUGULAR AUGMENT: NORMAL
BH CV UPPER VENOUS LEFT INTERNAL JUGULAR COMPRESS: NORMAL
BH CV UPPER VENOUS LEFT INTERNAL JUGULAR PHASIC: NORMAL
BH CV UPPER VENOUS LEFT INTERNAL JUGULAR SPONT: NORMAL
BH CV UPPER VENOUS LEFT SUBCLAVIAN AUGMENT: NORMAL
BH CV UPPER VENOUS LEFT SUBCLAVIAN COMPRESS: NORMAL
BH CV UPPER VENOUS LEFT SUBCLAVIAN PHASIC: NORMAL
BH CV UPPER VENOUS LEFT SUBCLAVIAN SPONT: NORMAL
BH CV UPPER VENOUS RIGHT AXILLARY AUGMENT: NORMAL
BH CV UPPER VENOUS RIGHT AXILLARY COMPRESS: NORMAL
BH CV UPPER VENOUS RIGHT AXILLARY PHASIC: NORMAL
BH CV UPPER VENOUS RIGHT AXILLARY SPONT: NORMAL
BH CV UPPER VENOUS RIGHT BASILIC FOREARM COMPRESS: NORMAL
BH CV UPPER VENOUS RIGHT BASILIC UPPER COMPRESS: NORMAL
BH CV UPPER VENOUS RIGHT BRACHIAL COMPRESS: NORMAL
BH CV UPPER VENOUS RIGHT CEPHALIC FOREARM COLOR: 1
BH CV UPPER VENOUS RIGHT CEPHALIC FOREARM COMPRESS: NORMAL
BH CV UPPER VENOUS RIGHT CEPHALIC FOREARM THROMBUS: NORMAL
BH CV UPPER VENOUS RIGHT CEPHALIC UPPER COMPRESS: NORMAL
BH CV UPPER VENOUS RIGHT INTERNAL JUGULAR AUGMENT: NORMAL
BH CV UPPER VENOUS RIGHT INTERNAL JUGULAR COMPRESS: NORMAL
BH CV UPPER VENOUS RIGHT INTERNAL JUGULAR PHASIC: NORMAL
BH CV UPPER VENOUS RIGHT INTERNAL JUGULAR SPONT: NORMAL
BH CV UPPER VENOUS RIGHT MED CUBITAL COLOR: 1
BH CV UPPER VENOUS RIGHT MED CUBITAL COMPRESS: NORMAL
BH CV UPPER VENOUS RIGHT MED CUBITAL THROMBUS: NORMAL
BH CV UPPER VENOUS RIGHT RADIAL COMPRESS: NORMAL
BH CV UPPER VENOUS RIGHT SUBCLAVIAN AUGMENT: NORMAL
BH CV UPPER VENOUS RIGHT SUBCLAVIAN COMPRESS: NORMAL
BH CV UPPER VENOUS RIGHT SUBCLAVIAN PHASIC: NORMAL
BH CV UPPER VENOUS RIGHT SUBCLAVIAN SPONT: NORMAL
BH CV UPPER VENOUS RIGHT ULNAR COMPRESS: NORMAL
MAXIMAL PREDICTED HEART RATE: 171 BPM
STRESS TARGET HR: 145 BPM

## 2022-05-31 PROCEDURE — 93971 EXTREMITY STUDY: CPT

## 2022-05-31 RX ORDER — VENLAFAXINE HYDROCHLORIDE 37.5 MG/1
37.5 CAPSULE, EXTENDED RELEASE ORAL DAILY
Qty: 30 CAPSULE | Refills: 0 | OUTPATIENT
Start: 2022-05-31

## 2022-06-01 ENCOUNTER — LAB (OUTPATIENT)
Dept: LAB | Facility: HOSPITAL | Age: 52
End: 2022-06-01

## 2022-06-01 ENCOUNTER — OFFICE VISIT (OUTPATIENT)
Dept: ONCOLOGY | Facility: CLINIC | Age: 52
End: 2022-06-01

## 2022-06-01 VITALS
DIASTOLIC BLOOD PRESSURE: 78 MMHG | HEIGHT: 62 IN | SYSTOLIC BLOOD PRESSURE: 129 MMHG | RESPIRATION RATE: 16 BRPM | BODY MASS INDEX: 25.64 KG/M2 | WEIGHT: 139.3 LBS | OXYGEN SATURATION: 100 % | TEMPERATURE: 97.8 F | HEART RATE: 76 BPM

## 2022-06-01 DIAGNOSIS — D62 ACUTE BLOOD LOSS ANEMIA: ICD-10-CM

## 2022-06-01 DIAGNOSIS — D50.0 IRON DEFICIENCY ANEMIA DUE TO CHRONIC BLOOD LOSS: Primary | ICD-10-CM

## 2022-06-01 DIAGNOSIS — I82.621 ACUTE DEEP VEIN THROMBOSIS (DVT) OF ULNAR VEIN OF RIGHT UPPER EXTREMITY: ICD-10-CM

## 2022-06-01 LAB
BASOPHILS # BLD AUTO: 0.06 10*3/MM3 (ref 0–0.2)
BASOPHILS NFR BLD AUTO: 1.3 % (ref 0–1.5)
DEPRECATED RDW RBC AUTO: 68.3 FL (ref 37–54)
EOSINOPHIL # BLD AUTO: 0.1 10*3/MM3 (ref 0–0.4)
EOSINOPHIL NFR BLD AUTO: 2.2 % (ref 0.3–6.2)
ERYTHROCYTE [DISTWIDTH] IN BLOOD BY AUTOMATED COUNT: 19.9 % (ref 12.3–15.4)
FERRITIN SERPL-MCNC: 50.3 NG/ML (ref 11–207)
HCT VFR BLD AUTO: 30.8 % (ref 34–46.6)
HGB BLD-MCNC: 8.6 G/DL (ref 12–15.9)
HGB RETIC QN AUTO: 24.2 PG (ref 29.8–36.1)
IMM GRANULOCYTES # BLD AUTO: 0.01 10*3/MM3 (ref 0–0.05)
IMM GRANULOCYTES NFR BLD AUTO: 0.2 % (ref 0–0.5)
IMM RETICS NFR: 26.2 % (ref 3–15.8)
IRON 24H UR-MRATE: 21 MCG/DL (ref 37–145)
IRON SATN MFR SERPL: 5 % (ref 14–48)
LYMPHOCYTES # BLD AUTO: 1.16 10*3/MM3 (ref 0.7–3.1)
LYMPHOCYTES NFR BLD AUTO: 25.8 % (ref 19.6–45.3)
MCH RBC QN AUTO: 26.1 PG (ref 26.6–33)
MCHC RBC AUTO-ENTMCNC: 27.9 G/DL (ref 31.5–35.7)
MCV RBC AUTO: 93.3 FL (ref 79–97)
MONOCYTES # BLD AUTO: 0.29 10*3/MM3 (ref 0.1–0.9)
MONOCYTES NFR BLD AUTO: 6.5 % (ref 5–12)
NEUTROPHILS NFR BLD AUTO: 2.87 10*3/MM3 (ref 1.7–7)
NEUTROPHILS NFR BLD AUTO: 64 % (ref 42.7–76)
NRBC BLD AUTO-RTO: 0 /100 WBC (ref 0–0.2)
PLATELET # BLD AUTO: 343 10*3/MM3 (ref 140–450)
PMV BLD AUTO: 11.3 FL (ref 6–12)
RBC # BLD AUTO: 3.3 10*6/MM3 (ref 3.77–5.28)
RETICS # AUTO: 0.13 10*6/MM3 (ref 0.02–0.13)
RETICS/RBC NFR AUTO: 3.92 % (ref 0.7–1.9)
TIBC SERPL-MCNC: 414 MCG/DL (ref 249–505)
TRANSFERRIN SERPL-MCNC: 296 MG/DL (ref 200–360)
WBC NRBC COR # BLD: 4.49 10*3/MM3 (ref 3.4–10.8)

## 2022-06-01 PROCEDURE — 82728 ASSAY OF FERRITIN: CPT

## 2022-06-01 PROCEDURE — 85025 COMPLETE CBC W/AUTO DIFF WBC: CPT

## 2022-06-01 PROCEDURE — 84466 ASSAY OF TRANSFERRIN: CPT

## 2022-06-01 PROCEDURE — 36415 COLL VENOUS BLD VENIPUNCTURE: CPT

## 2022-06-01 PROCEDURE — 85046 RETICYTE/HGB CONCENTRATE: CPT

## 2022-06-01 PROCEDURE — 99214 OFFICE O/P EST MOD 30 MIN: CPT | Performed by: INTERNAL MEDICINE

## 2022-06-01 PROCEDURE — 83540 ASSAY OF IRON: CPT

## 2022-06-06 DIAGNOSIS — F33.2 SEVERE EPISODE OF RECURRENT MAJOR DEPRESSIVE DISORDER, WITHOUT PSYCHOTIC FEATURES: ICD-10-CM

## 2022-06-06 RX ORDER — APIXABAN 5 MG/1
TABLET, FILM COATED ORAL
Qty: 74 TABLET | OUTPATIENT
Start: 2022-06-06

## 2022-06-12 DIAGNOSIS — F33.2 SEVERE EPISODE OF RECURRENT MAJOR DEPRESSIVE DISORDER, WITHOUT PSYCHOTIC FEATURES: ICD-10-CM

## 2022-06-13 ENCOUNTER — DOCUMENTATION (OUTPATIENT)
Dept: ONCOLOGY | Facility: CLINIC | Age: 52
End: 2022-06-13

## 2022-06-13 RX ORDER — SERTRALINE HYDROCHLORIDE 100 MG/1
100 TABLET, FILM COATED ORAL DAILY
Qty: 30 TABLET | Refills: 0 | Status: SHIPPED | OUTPATIENT
Start: 2022-06-13

## 2022-06-14 ENCOUNTER — DOCUMENTATION (OUTPATIENT)
Dept: ONCOLOGY | Facility: CLINIC | Age: 52
End: 2022-06-14

## 2024-08-19 ENCOUNTER — TELEPHONE (OUTPATIENT)
Dept: OBSTETRICS AND GYNECOLOGY | Facility: CLINIC | Age: 54
End: 2024-08-19

## 2024-12-12 ENCOUNTER — OFFICE VISIT (OUTPATIENT)
Dept: OBSTETRICS AND GYNECOLOGY | Facility: CLINIC | Age: 54
End: 2024-12-12
Payer: COMMERCIAL

## 2024-12-12 VITALS
HEIGHT: 62 IN | SYSTOLIC BLOOD PRESSURE: 138 MMHG | DIASTOLIC BLOOD PRESSURE: 92 MMHG | HEART RATE: 67 BPM | BODY MASS INDEX: 29.7 KG/M2 | WEIGHT: 161.4 LBS

## 2024-12-12 DIAGNOSIS — Z01.419 ENCOUNTER FOR WELL WOMAN EXAM WITH ROUTINE GYNECOLOGICAL EXAM: Primary | ICD-10-CM

## 2024-12-12 DIAGNOSIS — Z98.890 HISTORY OF ENDOMETRIAL ABLATION: ICD-10-CM

## 2024-12-12 DIAGNOSIS — R03.0 ELEVATED BLOOD PRESSURE READING: ICD-10-CM

## 2024-12-12 DIAGNOSIS — N95.1 PERIMENOPAUSE: ICD-10-CM

## 2024-12-12 RX ORDER — OLOPATADINE HYDROCHLORIDE 2 MG/ML
1 SOLUTION/ DROPS OPHTHALMIC DAILY
COMMUNITY
Start: 2024-04-16 | End: 2025-04-16

## 2024-12-12 RX ORDER — BUPROPION HYDROCHLORIDE 150 MG/1
150 TABLET ORAL DAILY
COMMUNITY
Start: 2024-10-17 | End: 2025-10-17

## 2024-12-12 RX ORDER — ERGOCALCIFEROL 1.25 MG/1
CAPSULE, LIQUID FILLED ORAL
COMMUNITY
Start: 2024-11-26

## 2024-12-12 RX ORDER — BUPROPION HYDROCHLORIDE 300 MG/1
300 TABLET ORAL DAILY
COMMUNITY
Start: 2024-10-17 | End: 2025-10-17

## 2024-12-12 RX ORDER — LEVOTHYROXINE SODIUM 25 UG/1
25 TABLET ORAL DAILY
COMMUNITY
Start: 2024-10-16 | End: 2025-01-14

## 2024-12-12 RX ORDER — LEVOTHYROXINE SODIUM 200 UG/1
200 TABLET ORAL DAILY
COMMUNITY
Start: 2024-10-16 | End: 2025-01-14

## 2024-12-12 NOTE — PROGRESS NOTES
GYN Annual Exam     CC- Here for annual exam.     Biju Harrington is a 54 y.o. female who presents for annual well woman exam. Denies vaginal bleeding since endometrial ablation in 05/2022. She reports having hot flashes and night sweats since after surgery 2 years ago.     OB History    No obstetric history on file.       She has history of hysteroscopy, dilation and curettage, endometrial resection with Myosure, endometrial ablation 5/13/22.   Current contraception: tubal ligation  History of abnormal Pap smear: yes - she reports dysplasia when she was younger that did not require excision procedure   Family history of uterine, colon or ovarian cancer: yes - colon cancer - paternal grandfather   History of abnormal mammogram: no  Family history of breast cancer: no  Last Pap : 9/28/21- NILM, negative HPV  Last Completed Pap Smear       This patient has no relevant Health Maintenance data.           Last mammogram: 5/2/24- BIRADS-1  Last Completed Mammogram            MAMMOGRAM (Every 2 Years) Next due on 5/2/2026 05/02/2024  Mammo Screening Digital Tomosynthesis Bilateral With CAD    10/11/2021  Mammo Screening Bilateral With CAD                   Last colonoscopy:  2022- repeat in 10 years   Last Completed Colonoscopy            COLORECTAL CANCER SCREENING (COLONOSCOPY - Every 10 Years) Tentatively due on 2/25/2032 02/25/2022  COLONOSCOPY    02/25/2022  Surgical Procedure: COLONOSCOPY                   Last DEXA: n/a; Frax score 4.0%, 0.2%  Parental Hip Fracture: denies     Past Medical History:   Diagnosis Date    Anxiety and depression     Hot flashes     Migraine     PONV (postoperative nausea and vomiting)        Past Surgical History:   Procedure Laterality Date    CHOLECYSTECTOMY      COLONOSCOPY N/A 2/25/2022    Procedure: COLONOSCOPY WITH BIOPSY;  Surgeon: Leigh Andrade MD;  Location: [facility] ENDOSCOPY;  Service: Gastroenterology;  Laterality: N/A;  COLON POLYP, HEMORRHOIDS    D & C  HYSTEROSCOPY ENDOMETRIAL ABLATION N/A 5/13/2022    Procedure: DILATATION AND CURETTAGE, HYSTEROSCOPY NOVASURE ENDOMETRIAL ABLATION, endometrial resection with myosure;  Surgeon: Jia Russell MD;  Location: [facility];  Service: Obstetrics/Gynecology;  Laterality: N/A;    OVARIAN CYST REMOVAL      ROTATOR CUFF REPAIR Right     TUBAL ABDOMINAL LIGATION           Current Outpatient Medications:     buPROPion XL (WELLBUTRIN XL) 150 MG 24 hr tablet, Take 1 tablet by mouth Daily., Disp: , Rfl:     buPROPion XL (WELLBUTRIN XL) 300 MG 24 hr tablet, Take 1 tablet by mouth Daily., Disp: , Rfl:     folic acid (FOLVITE) 1 MG tablet, Take 1 tablet by mouth Daily., Disp: 30 tablet, Rfl: 0    levothyroxine (SYNTHROID, LEVOTHROID) 200 MCG tablet, Take 1 tablet by mouth Daily., Disp: , Rfl:     levothyroxine (SYNTHROID, LEVOTHROID) 25 MCG tablet, Take 1 tablet by mouth Daily., Disp: , Rfl:     multivitamin with minerals tablet tablet, Take 1 tablet by mouth Daily. OTC, Disp: , Rfl:     olopatadine (PATADAY) 0.2 % solution ophthalmic solution, Apply 1 drop to eye(s) as directed by provider Daily., Disp: , Rfl:     sertraline (ZOLOFT) 100 MG tablet, TAKE 1 TABLET BY MOUTH DAILY, Disp: 30 tablet, Rfl: 0    vitamin B-12 (CYANOCOBALAMIN) 1000 MCG tablet, Take 1 tablet by mouth Daily., Disp: 30 tablet, Rfl: 0    vitamin D (ERGOCALCIFEROL) 1.25 MG (50996 UT) capsule capsule, , Disp: , Rfl:     Allergies   Allergen Reactions    Phenergan [Promethazine] GI Intolerance       Social History     Tobacco Use    Smoking status: Former     Current packs/day: 0.00     Average packs/day: 0.5 packs/day for 32.7 years (16.4 ttl pk-yrs)     Types: Cigarettes     Start date: 1989     Quit date: 10/2021     Years since quitting: 3.2    Smokeless tobacco: Never   Vaping Use    Vaping status: Never Used   Substance Use Topics    Alcohol use: Yes     Comment: occasionally    Drug use: Never       Family History   Problem Relation Age of Onset     "Diabetes Father     Diabetes Maternal Grandmother     Cancer Paternal Grandfather         colon    Malig Hyperthermia Neg Hx        Review of Systems   All other systems reviewed and are negative.      /94   Pulse 67   Ht 157.5 cm (62.01\")   Wt 73.2 kg (161 lb 6.4 oz)   BMI 29.51 kg/m²   Repeat /92    Physical Exam  Vitals reviewed. Exam conducted with a chaperone present.   Constitutional:       General: She is not in acute distress.  HENT:      Head: Normocephalic and atraumatic.      Right Ear: External ear normal.      Left Ear: External ear normal.   Eyes:      Extraocular Movements: Extraocular movements intact.      Pupils: Pupils are equal, round, and reactive to light.   Cardiovascular:      Rate and Rhythm: Normal rate.   Pulmonary:      Effort: Pulmonary effort is normal. No respiratory distress.   Chest:   Breasts:     Right: No swelling, bleeding, inverted nipple, mass, nipple discharge, skin change or tenderness.      Left: No swelling, bleeding, inverted nipple, mass, nipple discharge, skin change or tenderness.   Abdominal:      General: There is no distension.      Palpations: Abdomen is soft.      Tenderness: There is no abdominal tenderness. There is no guarding or rebound.   Genitourinary:     General: Normal vulva.      Exam position: Lithotomy position.      Labia:         Right: No rash, tenderness, lesion or injury.         Left: No rash, tenderness, lesion or injury.       Urethra: No prolapse or urethral swelling.      Vagina: No vaginal discharge, erythema, tenderness, bleeding or lesions.      Cervix: Normal.      Uterus: Not enlarged, not fixed and not tender.       Adnexa:         Right: No mass, tenderness or fullness.          Left: No mass, tenderness or fullness.     Musculoskeletal:         General: No deformity. Normal range of motion.      Cervical back: Normal range of motion and neck supple.   Lymphadenopathy:      Upper Body:      Right upper body: No " supraclavicular or axillary adenopathy.      Left upper body: No supraclavicular or axillary adenopathy.      Lower Body: No right inguinal adenopathy. No left inguinal adenopathy.   Skin:     General: Skin is warm and dry.   Neurological:      General: No focal deficit present.      Mental Status: She is alert and oriented to person, place, and time.   Psychiatric:         Mood and Affect: Mood normal.         Behavior: Behavior normal.       Assessment     1) GYN annual well woman exam.   2) History of endometrial ablation  3) Perimenopause   4) Elevated blood pressure      Plan     1) Breast Health - Clinical breast exam & mammogram yearly, Self breast awareness monthly. Mammogram up to date. Due in 05/2025.   2) Pap - Up to date. Due in 2026 per ASCCP guidelines.   3) Smoking status- former smoker.   4) Colon health - screening colonoscopy recommended if not up to date  5) Bone health - Weight bearing exercise, dietary calcium recommendations and vitamin D reviewed.   6) Activity recommends - Adult 150-300 min/week of multi-component physical activities that include balance training, aerobic and physical strengthening.    7) Elevated blood pressure- Blood pressure elevated on initial evaluation and on repeat evaluation. Recommend she follow up with her PCP and encouraged lifestyle changes.   8) Follow up prn and one year      Jia Russell MD

## (undated) DEVICE — ST IRR CYSTO W/SPK 77IN LF

## (undated) DEVICE — STRAP STIRUP WO/ RNG

## (undated) DEVICE — GOWN,SIRUS,NON REINFRCD,LARGE,SET IN SL: Brand: MEDLINE

## (undated) DEVICE — COLON KIT: Brand: MEDLINE INDUSTRIES, INC.

## (undated) DEVICE — PROB ABL ENDOMTRL NOVASURE/G4 W/SURESND

## (undated) DEVICE — LOU D & C HYSTEROSCOPY: Brand: MEDLINE INDUSTRIES, INC.

## (undated) DEVICE — SINGLE-USE BIOPSY FORCEPS: Brand: RADIAL JAW 4

## (undated) DEVICE — SOL IRRG H2O PL/BG 1000ML STRL

## (undated) DEVICE — GLV SURG SENSICARE POLYISPRN W/ALOE PF LF 6.5 GRN STRL

## (undated) DEVICE — 1000ML,PRESSURE INFUSER W/STOPCOCK: Brand: MEDLINE

## (undated) DEVICE — Device: Brand: DEFENDO AIR/WATER/SUCTION AND BIOPSY VALVE

## (undated) DEVICE — SOL NACL 0.9PCT 1000ML

## (undated) DEVICE — DEV TISS REMOV MYOSURE REACH

## (undated) DEVICE — GLV SURG BIOGEL LTX PF 6